# Patient Record
Sex: FEMALE | NOT HISPANIC OR LATINO | Employment: FULL TIME | URBAN - METROPOLITAN AREA
[De-identification: names, ages, dates, MRNs, and addresses within clinical notes are randomized per-mention and may not be internally consistent; named-entity substitution may affect disease eponyms.]

---

## 2018-10-29 ENCOUNTER — TRANSCRIBE ORDERS (OUTPATIENT)
Dept: ADMINISTRATIVE | Facility: HOSPITAL | Age: 48
End: 2018-10-29

## 2018-10-29 DIAGNOSIS — R07.9 CHEST PAIN, UNSPECIFIED TYPE: Primary | ICD-10-CM

## 2018-10-29 DIAGNOSIS — R42 DIZZINESS: ICD-10-CM

## 2018-11-01 ENCOUNTER — HOSPITAL ENCOUNTER (OUTPATIENT)
Dept: CARDIOLOGY | Facility: HOSPITAL | Age: 48
Discharge: HOME | End: 2018-11-01
Admitting: NURSE PRACTITIONER

## 2018-11-01 DIAGNOSIS — R07.9 CHEST PAIN, UNSPECIFIED TYPE: ICD-10-CM

## 2018-11-01 DIAGNOSIS — R42 DIZZINESS: ICD-10-CM

## 2018-11-01 LAB
BH CV ECHO MEAS - ACS: 1.7 CM
BH CV ECHO MEAS - AO MAX PG (FULL): 3 MMHG
BH CV ECHO MEAS - AO MAX PG: 5.4 MMHG
BH CV ECHO MEAS - AO MEAN PG (FULL): 2 MMHG
BH CV ECHO MEAS - AO MEAN PG: 3 MMHG
BH CV ECHO MEAS - AO ROOT AREA (BSA CORRECTED): 1.5
BH CV ECHO MEAS - AO ROOT AREA: 4.5 CM^2
BH CV ECHO MEAS - AO ROOT DIAM: 2.4 CM
BH CV ECHO MEAS - AO V2 MAX: 116 CM/SEC
BH CV ECHO MEAS - AO V2 MEAN: 87.5 CM/SEC
BH CV ECHO MEAS - AO V2 VTI: 20 CM
BH CV ECHO MEAS - AVA(I,A): 2 CM^2
BH CV ECHO MEAS - AVA(I,D): 2 CM^2
BH CV ECHO MEAS - AVA(V,A): 2.1 CM^2
BH CV ECHO MEAS - AVA(V,D): 2.1 CM^2
BH CV ECHO MEAS - BSA(HAYCOCK): 1.7 M^2
BH CV ECHO MEAS - BSA: 1.6 M^2
BH CV ECHO MEAS - BZI_BMI: 26.8 KILOGRAMS/M^2
BH CV ECHO MEAS - BZI_METRIC_HEIGHT: 154.9 CM
BH CV ECHO MEAS - BZI_METRIC_WEIGHT: 64.4 KG
BH CV ECHO MEAS - EDV(CUBED): 50.7 ML
BH CV ECHO MEAS - EDV(MOD-SP2): 67 ML
BH CV ECHO MEAS - EDV(MOD-SP4): 64 ML
BH CV ECHO MEAS - EDV(TEICH): 58.1 ML
BH CV ECHO MEAS - EF(CUBED): 65.3 %
BH CV ECHO MEAS - EF(MOD-BP): 55 %
BH CV ECHO MEAS - EF(MOD-SP2): 59.7 %
BH CV ECHO MEAS - EF(MOD-SP4): 67.2 %
BH CV ECHO MEAS - EF(TEICH): 57.7 %
BH CV ECHO MEAS - ESV(CUBED): 17.6 ML
BH CV ECHO MEAS - ESV(MOD-SP2): 27 ML
BH CV ECHO MEAS - ESV(MOD-SP4): 21 ML
BH CV ECHO MEAS - ESV(TEICH): 24.6 ML
BH CV ECHO MEAS - FS: 29.7 %
BH CV ECHO MEAS - IVS/LVPW: 1
BH CV ECHO MEAS - IVSD: 0.9 CM
BH CV ECHO MEAS - LAT PEAK E' VEL: 17.2 CM/SEC
BH CV ECHO MEAS - LV DIASTOLIC VOL/BSA (35-75): 39.2 ML/M^2
BH CV ECHO MEAS - LV MASS(C)D: 96.9 GRAMS
BH CV ECHO MEAS - LV MASS(C)DI: 59.3 GRAMS/M^2
BH CV ECHO MEAS - LV MAX PG: 2.4 MMHG
BH CV ECHO MEAS - LV MEAN PG: 1 MMHG
BH CV ECHO MEAS - LV SYSTOLIC VOL/BSA (12-30): 12.9 ML/M^2
BH CV ECHO MEAS - LV V1 MAX: 77.6 CM/SEC
BH CV ECHO MEAS - LV V1 MEAN: 53.7 CM/SEC
BH CV ECHO MEAS - LV V1 VTI: 12.8 CM
BH CV ECHO MEAS - LVIDD: 3.7 CM
BH CV ECHO MEAS - LVIDS: 2.6 CM
BH CV ECHO MEAS - LVLD AP2: 7.4 CM
BH CV ECHO MEAS - LVLD AP4: 6.4 CM
BH CV ECHO MEAS - LVLS AP2: 6.4 CM
BH CV ECHO MEAS - LVLS AP4: 6.2 CM
BH CV ECHO MEAS - LVOT AREA (M): 3.1 CM^2
BH CV ECHO MEAS - LVOT AREA: 3.1 CM^2
BH CV ECHO MEAS - LVOT DIAM: 2 CM
BH CV ECHO MEAS - LVPWD: 0.9 CM
BH CV ECHO MEAS - MED PEAK E' VEL: 7.9 CM/SEC
BH CV ECHO MEAS - MV A DUR: 0.13 SEC
BH CV ECHO MEAS - MV A MAX VEL: 76.2 CM/SEC
BH CV ECHO MEAS - MV DEC SLOPE: 304 CM/SEC^2
BH CV ECHO MEAS - MV DEC TIME: 0.25 SEC
BH CV ECHO MEAS - MV E MAX VEL: 59.2 CM/SEC
BH CV ECHO MEAS - MV E/A: 0.78
BH CV ECHO MEAS - MV MAX PG: 3.9 MMHG
BH CV ECHO MEAS - MV MEAN PG: 2 MMHG
BH CV ECHO MEAS - MV P1/2T MAX VEL: 83.6 CM/SEC
BH CV ECHO MEAS - MV P1/2T: 80.5 MSEC
BH CV ECHO MEAS - MV V2 MAX: 99.1 CM/SEC
BH CV ECHO MEAS - MV V2 MEAN: 69.1 CM/SEC
BH CV ECHO MEAS - MV V2 VTI: 18.4 CM
BH CV ECHO MEAS - MVA P1/2T LCG: 2.6 CM^2
BH CV ECHO MEAS - MVA(P1/2T): 2.7 CM^2
BH CV ECHO MEAS - MVA(VTI): 2.2 CM^2
BH CV ECHO MEAS - PA ACC TIME: 0.13 SEC
BH CV ECHO MEAS - PA MAX PG (FULL): 0.78 MMHG
BH CV ECHO MEAS - PA MAX PG: 2.4 MMHG
BH CV ECHO MEAS - PA PR(ACCEL): 20.5 MMHG
BH CV ECHO MEAS - PA V2 MAX: 77.4 CM/SEC
BH CV ECHO MEAS - PULM A REVS DUR: 0.13 SEC
BH CV ECHO MEAS - PULM A REVS VEL: 29.6 CM/SEC
BH CV ECHO MEAS - PULM DIAS VEL: 27.6 CM/SEC
BH CV ECHO MEAS - PULM S/D: 1.6
BH CV ECHO MEAS - PULM SYS VEL: 43.7 CM/SEC
BH CV ECHO MEAS - PVA(V,A): 1.3 CM^2
BH CV ECHO MEAS - PVA(V,D): 1.3 CM^2
BH CV ECHO MEAS - QP/QS: 0.38
BH CV ECHO MEAS - RV MAX PG: 1.6 MMHG
BH CV ECHO MEAS - RV MEAN PG: 1 MMHG
BH CV ECHO MEAS - RV V1 MAX: 63.5 CM/SEC
BH CV ECHO MEAS - RV V1 MEAN: 46 CM/SEC
BH CV ECHO MEAS - RV V1 VTI: 9.8 CM
BH CV ECHO MEAS - RVOT AREA: 1.5 CM^2
BH CV ECHO MEAS - RVOT DIAM: 1.4 CM
BH CV ECHO MEAS - SI(AO): 55.4 ML/M^2
BH CV ECHO MEAS - SI(CUBED): 20.3 ML/M^2
BH CV ECHO MEAS - SI(LVOT): 24.6 ML/M^2
BH CV ECHO MEAS - SI(MOD-SP2): 24.5 ML/M^2
BH CV ECHO MEAS - SI(MOD-SP4): 26.3 ML/M^2
BH CV ECHO MEAS - SI(TEICH): 20.5 ML/M^2
BH CV ECHO MEAS - SV(AO): 90.5 ML
BH CV ECHO MEAS - SV(CUBED): 33.1 ML
BH CV ECHO MEAS - SV(LVOT): 40.2 ML
BH CV ECHO MEAS - SV(MOD-SP2): 40 ML
BH CV ECHO MEAS - SV(MOD-SP4): 43 ML
BH CV ECHO MEAS - SV(RVOT): 15.1 ML
BH CV ECHO MEAS - SV(TEICH): 33.5 ML
BH CV ECHO MEAS - TAPSE (>1.6): 1.6 CM2
BH CV ECHO MEASUREMENTS AVERAGE E/E' RATIO: 4.72
BH CV STRESS BP STAGE 1: NORMAL
BH CV STRESS BP STAGE 2: NORMAL
BH CV STRESS DURATION MIN STAGE 1: 3
BH CV STRESS DURATION MIN STAGE 2: 2
BH CV STRESS DURATION SEC STAGE 1: 0
BH CV STRESS DURATION SEC STAGE 2: 5
BH CV STRESS ECHO POST STRESS EJECTION FRACTION EF: 66 %
BH CV STRESS GRADE STAGE 1: 10
BH CV STRESS GRADE STAGE 2: 12
BH CV STRESS HR STAGE 1: 152
BH CV STRESS HR STAGE 2: 164
BH CV STRESS METS STAGE 1: 5
BH CV STRESS METS STAGE 2: 7.5
BH CV STRESS PROTOCOL 1: NORMAL
BH CV STRESS RECOVERY BP: NORMAL MMHG
BH CV STRESS RECOVERY HR: 108 BPM
BH CV STRESS SPEED STAGE 1: 1.7
BH CV STRESS SPEED STAGE 2: 2.5
BH CV STRESS STAGE 1: 1
BH CV STRESS STAGE 2: 2
BH CV XLRA - RV BASE: 2.6 CM
BH CV XLRA - RV LENGTH: 6.1 CM
BH CV XLRA - RV MID: 2.4 CM
BH CV XLRA - TDI S': 12.4 CM/SEC
LEFT ATRIUM VOLUME INDEX: 16 ML/M2
MAXIMAL PREDICTED HEART RATE: 174 BPM
PERCENT MAX PREDICTED HR: 94.25 %
STRESS BASELINE BP: NORMAL MMHG
STRESS BASELINE HR: 117 BPM
STRESS PERCENT HR: 111 %
STRESS POST ESTIMATED WORKLOAD: 7.1 METS
STRESS POST EXERCISE DUR MIN: 5 MIN
STRESS POST EXERCISE DUR SEC: 5 SEC
STRESS POST PEAK BP: NORMAL MMHG
STRESS POST PEAK HR: 164 BPM
STRESS TARGET HR: 148 BPM

## 2018-11-01 PROCEDURE — 93016 CV STRESS TEST SUPVJ ONLY: CPT | Performed by: INTERNAL MEDICINE

## 2018-11-01 PROCEDURE — 93018 CV STRESS TEST I&R ONLY: CPT | Performed by: INTERNAL MEDICINE

## 2018-11-01 PROCEDURE — 93325 DOPPLER ECHO COLOR FLOW MAPG: CPT | Performed by: INTERNAL MEDICINE

## 2018-11-01 PROCEDURE — 93017 CV STRESS TEST TRACING ONLY: CPT

## 2018-11-01 PROCEDURE — 93350 STRESS TTE ONLY: CPT

## 2018-11-01 PROCEDURE — 93350 STRESS TTE ONLY: CPT | Performed by: INTERNAL MEDICINE

## 2018-11-01 PROCEDURE — 93325 DOPPLER ECHO COLOR FLOW MAPG: CPT

## 2018-11-01 PROCEDURE — 93320 DOPPLER ECHO COMPLETE: CPT

## 2018-11-01 PROCEDURE — 93352 ADMIN ECG CONTRAST AGENT: CPT | Performed by: INTERNAL MEDICINE

## 2018-11-01 PROCEDURE — 25010000002 PERFLUTREN (DEFINITY) 8.476 MG IN SODIUM CHLORIDE 0.9 % 10 ML INJECTION: Performed by: INTERNAL MEDICINE

## 2018-11-01 PROCEDURE — 93320 DOPPLER ECHO COMPLETE: CPT | Performed by: INTERNAL MEDICINE

## 2018-11-01 RX ORDER — CYCLOBENZAPRINE HCL 10 MG
TABLET ORAL AS NEEDED
COMMUNITY
Start: 2018-08-14

## 2018-11-01 RX ORDER — SPIRONOLACTONE 100 MG/1
100 TABLET, FILM COATED ORAL 2 TIMES DAILY
COMMUNITY
Start: 2017-12-01

## 2018-11-01 RX ORDER — MINOCYCLINE HYDROCHLORIDE 50 MG/1
CAPSULE ORAL AS NEEDED
COMMUNITY
Start: 2018-05-21

## 2018-11-01 RX ADMIN — SODIUM CHLORIDE 5 ML: 9 INJECTION INTRAMUSCULAR; INTRAVENOUS; SUBCUTANEOUS at 15:39

## 2021-04-02 ENCOUNTER — BULK ORDERING (OUTPATIENT)
Dept: CASE MANAGEMENT | Facility: OTHER | Age: 51
End: 2021-04-02

## 2021-04-02 DIAGNOSIS — Z23 IMMUNIZATION DUE: ICD-10-CM

## 2021-09-15 ENCOUNTER — TRANSCRIBE ORDERS (OUTPATIENT)
Dept: PHYSICAL THERAPY | Facility: CLINIC | Age: 51
End: 2021-09-15

## 2021-09-15 DIAGNOSIS — M46.1 INFLAMMATION OF SACROILIAC JOINT (HCC): Primary | ICD-10-CM

## 2023-08-14 ENCOUNTER — OFFICE VISIT (OUTPATIENT)
Dept: NEUROLOGY | Facility: CLINIC | Age: 53
End: 2023-08-14
Payer: COMMERCIAL

## 2023-08-14 ENCOUNTER — LAB (OUTPATIENT)
Dept: LAB | Facility: HOSPITAL | Age: 53
End: 2023-08-14
Payer: COMMERCIAL

## 2023-08-14 VITALS
OXYGEN SATURATION: 99 % | HEIGHT: 61 IN | HEART RATE: 91 BPM | SYSTOLIC BLOOD PRESSURE: 126 MMHG | DIASTOLIC BLOOD PRESSURE: 70 MMHG | BODY MASS INDEX: 30.78 KG/M2 | WEIGHT: 163 LBS

## 2023-08-14 DIAGNOSIS — G56.03 BILATERAL CARPAL TUNNEL SYNDROME: ICD-10-CM

## 2023-08-14 DIAGNOSIS — R41.89 BRAIN FOG: ICD-10-CM

## 2023-08-14 DIAGNOSIS — R20.2 PARESTHESIAS: Primary | ICD-10-CM

## 2023-08-14 DIAGNOSIS — G47.33 OBSTRUCTIVE SLEEP APNEA: ICD-10-CM

## 2023-08-14 DIAGNOSIS — R41.89 COGNITIVE CHANGE: ICD-10-CM

## 2023-08-14 LAB — VIT B12 BLD-MCNC: 1110 PG/ML (ref 211–946)

## 2023-08-14 PROCEDURE — 36415 COLL VENOUS BLD VENIPUNCTURE: CPT | Performed by: PSYCHIATRY & NEUROLOGY

## 2023-08-14 PROCEDURE — 82607 VITAMIN B-12: CPT | Performed by: PSYCHIATRY & NEUROLOGY

## 2023-08-14 RX ORDER — HYDROXYZINE 50 MG/1
50 TABLET, FILM COATED ORAL EVERY 8 HOURS PRN
COMMUNITY
Start: 2023-07-24

## 2023-08-14 RX ORDER — TRAMADOL HYDROCHLORIDE 50 MG/1
50 TABLET ORAL EVERY 6 HOURS PRN
COMMUNITY

## 2023-08-14 RX ORDER — MAGNESIUM OXIDE 400 MG/1
TABLET ORAL
COMMUNITY

## 2023-08-14 RX ORDER — DIAZEPAM 5 MG/1
TABLET ORAL
COMMUNITY

## 2023-08-14 NOTE — PROGRESS NOTES
Chief Complaint   Patient presents with    Amnesia       Patient ID: Kaylie Perez is a 52 y.o. female.    HPI: I had the pleasure of seeing your patient today.  As you may know she is a 52-year-old female being seen at the request of and referred to us by CHIQUIS Young for memory change.  The patient says that she noted the memory change specifically after COVID-19 infection in February of this year.  She states that during COVID she noted some brain fog.  She is also having trouble with losing things.  She would lose her keys a lot.  She would lose her phone often.  This was abnormal for her.  She also notes that she would forget to flush the toilet.  She never left the stove on or water running.  She was experiencing more fatigue as well, specifically after exertion for example working out.  She has noted some progression of the symptoms as well.  She is doing some cognitive therapy exercises on her own however she has not noted significant improvement.  Of note she was recently diagnosed with obstructive sleep apnea.  He has not yet started using a CPAP.  She does experience paresthesias in her feet as well as in her hands.  She does have a history of carpal tunnel syndrome bilaterally and is status post surgical release in 2009.  She does sleep with wrist splints.  No family history of dementia that she is aware of however her father started to have some memory change issues.    The following portions of the patient's history were reviewed and updated as appropriate: allergies, current medications, past family history, past medical history, past social history, past surgical history and problem list.    Review of Systems   Constitutional:  Positive for fatigue. Negative for chills and fever.   Respiratory:  Positive for apnea, cough, shortness of breath and wheezing.    Cardiovascular:  Positive for chest pain and palpitations.   Gastrointestinal:  Negative for diarrhea, nausea and vomiting.    Endocrine: Negative for cold intolerance, heat intolerance and polydipsia.   Genitourinary:  Negative for decreased urine volume, difficulty urinating and urgency.   Musculoskeletal:  Positive for back pain, neck pain and neck stiffness.   Skin:  Negative for color change, rash and wound.   Allergic/Immunologic: Negative for environmental allergies, food allergies and immunocompromised state.   Neurological:  Positive for dizziness, numbness (hands) and headaches. Negative for tremors, seizures, syncope, facial asymmetry, speech difficulty, weakness and light-headedness.   Hematological:  Negative for adenopathy. Does not bruise/bleed easily.   Psychiatric/Behavioral:  Positive for confusion (steffen fog) and sleep disturbance. Negative for decreased concentration. The patient is nervous/anxious.     I have reviewed the review of systems above performed by my medical assistant.      Vitals:    08/14/23 1426   BP: 126/70   Pulse: 91   SpO2: 99%       Neurologic Exam     Mental Status   Oriented to person, place, and time.   Registration: recalls 3 of 3 objects. Follows 3 step commands.   Attention: normal. Concentration: normal.   Speech: speech is normal   Level of consciousness: alert  Knowledge: consistent with education (No deficits found.).   Normal comprehension.     Cranial Nerves     CN II   Visual fields full to confrontation.     CN III, IV, VI   Pupils are equal, round, and reactive to light.  Extraocular motions are normal.   CN III: no CN III palsy  CN VI: no CN VI palsy  Nystagmus: none   Diplopia: none    CN V   Facial sensation intact.     CN VII   Facial expression full, symmetric.     CN VIII   CN VIII normal.     CN IX, X   CN IX normal.   CN X normal.     CN XI   CN XI normal.     CN XII   CN XII normal.     Motor Exam   Muscle bulk: normal  Right arm tone: normal  Left arm tone: normal  Right leg tone: normal  Left leg tone: normal    Strength   Right neck flexion: 5/5  Left neck flexion:  5/5  Right neck extension: 5/5  Left neck extension: 5/5  Right deltoid: 5/5  Left deltoid: 5/5  Right biceps: 5/5  Left biceps: 5/5  Right triceps: 5/5  Left triceps: 5/5  Right wrist flexion: 5/5  Left wrist flexion: 5/5  Right wrist extension: 5/5  Left wrist extension: 5/5  Right interossei: 5/5  Left interossei: 5/5  Right abdominals: 5/5  Left abdominals: 5/5  Right iliopsoas: 5/5  Left iliopsoas: 5/5  Right quadriceps: 5/5  Left quadriceps: 5/5  Right hamstrin/5  Left hamstrin/5  Right glutei: 5/5  Left glutei: 5/5  Right anterior tibial: 5/5  Left anterior tibial: 5/5  Right posterior tibial: 5/5  Left posterior tibial: 5/5  Right peroneal: 5/5  Left peroneal: 5/5  Right gastroc: 5/5  Left gastroc: 5/5    Sensory Exam   Light touch normal.   Vibration normal.   Proprioception normal.   Pinprick normal.     Gait, Coordination, and Reflexes     Gait  Gait: normal    Coordination   Romberg: negative    Tremor   Resting tremor: absent  Intention tremor: absent    Reflexes   Right brachioradialis: 2+  Left brachioradialis: 2+  Right biceps: 2+  Left biceps: 2+  Right triceps: 2+  Left triceps: 2+  Right patellar: 2+  Left patellar: 2+  Right achilles: 2+  Left achilles: 2+  Right : 2+  Left : 2+Station is normal.     Physical Exam  Vitals reviewed.   Constitutional:       General: She is not in acute distress.     Appearance: She is well-developed.   HENT:      Head: Normocephalic and atraumatic.   Eyes:      Extraocular Movements: EOM normal.      Pupils: Pupils are equal, round, and reactive to light.   Cardiovascular:      Rate and Rhythm: Normal rate and regular rhythm.      Heart sounds: Normal heart sounds.   Pulmonary:      Effort: Pulmonary effort is normal. No respiratory distress.      Breath sounds: Normal breath sounds.   Abdominal:      General: Bowel sounds are normal. There is no distension.      Palpations: Abdomen is soft.      Tenderness: There is no abdominal tenderness.    Musculoskeletal:         General: No deformity.      Cervical back: Normal range of motion.   Skin:     General: Skin is warm.      Findings: No rash.   Neurological:      Mental Status: She is oriented to person, place, and time.      Coordination: Romberg Test normal.      Gait: Gait is intact.      Deep Tendon Reflexes:      Reflex Scores:       Tricep reflexes are 2+ on the right side and 2+ on the left side.       Bicep reflexes are 2+ on the right side and 2+ on the left side.       Brachioradialis reflexes are 2+ on the right side and 2+ on the left side.       Patellar reflexes are 2+ on the right side and 2+ on the left side.       Achilles reflexes are 2+ on the right side and 2+ on the left side.  Psychiatric:         Speech: Speech normal.         Judgment: Judgment normal.       Procedures    Assessment/Plan: I did talk to her at great length about some of the risk factors that cognitive issues in her age group, specifically the untreated sleep apnea as well as the stress that she has been experiencing in life in general.  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  We will check a vitamin B12 level today.  We did talk about cognitive assessments and we will schedule her for neuropsych testing.  However I did encourage her to get the CPAP machine and start using it prior to cognitive testing.  Therefore she will schedule the date of neuropsych testing out for a few months after she starts CPAP therapy.       Diagnoses and all orders for this visit:    1. Paresthesias (Primary)  -     Vitamin B12    2. Bilateral carpal tunnel syndrome    3. Brain fog  -     Vitamin B12  -     Ambulatory Referral to Neuropsychology    4. Cognitive change  -     Vitamin B12  -     Ambulatory Referral to Neuropsychology    5. Obstructive sleep apnea           Vitor Khalil II, MD

## 2023-08-14 NOTE — LETTER
August 14, 2023       No Recipients    Patient: Kaylie Perez   YOB: 1970   Date of Visit: 8/14/2023     Dear CHIQUIS Arzate:       Thank you for referring Kaylie Perez to me for evaluation. Below are the relevant portions of my assessment and plan of care.    If you have questions, please do not hesitate to call me. I look forward to following Kaylie along with you.         Sincerely,        Vitor Khalil II, MD        CC:   No Recipients    Vitor Khalil II, MD  08/14/23 1525  Sign when Signing Visit  Chief Complaint   Patient presents with    Amnesia       Patient ID: Kaylie Perez is a 52 y.o. female.    HPI: I had the pleasure of seeing your patient today.  As you may know she is a 52-year-old female being seen at the request of and referred to us by CHIQUIS Young for memory change.  The patient says that she noted the memory change specifically after COVID-19 infection in February of this year.  She states that during COVID she noted some brain fog.  She is also having trouble with losing things.  She would lose her keys a lot.  She would lose her phone often.  This was abnormal for her.  She also notes that she would forget to flush the toilet.  She never left the stove on or water running.  She was experiencing more fatigue as well, specifically after exertion for example working out.  She has noted some progression of the symptoms as well.  She is doing some cognitive therapy exercises on her own however she has not noted significant improvement.  Of note she was recently diagnosed with obstructive sleep apnea.  He has not yet started using a CPAP.  She does experience paresthesias in her feet as well as in her hands.  She does have a history of carpal tunnel syndrome bilaterally and is status post surgical release in 2009.  She does sleep with wrist splints.  No family history of dementia that she is aware of however her father started to have some  memory change issues.    The following portions of the patient's history were reviewed and updated as appropriate: allergies, current medications, past family history, past medical history, past social history, past surgical history and problem list.    Review of Systems   Constitutional:  Positive for fatigue. Negative for chills and fever.   Respiratory:  Positive for apnea, cough, shortness of breath and wheezing.    Cardiovascular:  Positive for chest pain and palpitations.   Gastrointestinal:  Negative for diarrhea, nausea and vomiting.   Endocrine: Negative for cold intolerance, heat intolerance and polydipsia.   Genitourinary:  Negative for decreased urine volume, difficulty urinating and urgency.   Musculoskeletal:  Positive for back pain, neck pain and neck stiffness.   Skin:  Negative for color change, rash and wound.   Allergic/Immunologic: Negative for environmental allergies, food allergies and immunocompromised state.   Neurological:  Positive for dizziness, numbness (hands) and headaches. Negative for tremors, seizures, syncope, facial asymmetry, speech difficulty, weakness and light-headedness.   Hematological:  Negative for adenopathy. Does not bruise/bleed easily.   Psychiatric/Behavioral:  Positive for confusion (steffen fog) and sleep disturbance. Negative for decreased concentration. The patient is nervous/anxious.     I have reviewed the review of systems above performed by my medical assistant.      Vitals:    08/14/23 1426   BP: 126/70   Pulse: 91   SpO2: 99%       Neurologic Exam     Mental Status   Oriented to person, place, and time.   Registration: recalls 3 of 3 objects. Follows 3 step commands.   Attention: normal. Concentration: normal.   Speech: speech is normal   Level of consciousness: alert  Knowledge: consistent with education (No deficits found.).   Normal comprehension.     Cranial Nerves     CN II   Visual fields full to confrontation.     CN III, IV, VI   Pupils are equal,  round, and reactive to light.  Extraocular motions are normal.   CN III: no CN III palsy  CN VI: no CN VI palsy  Nystagmus: none   Diplopia: none    CN V   Facial sensation intact.     CN VII   Facial expression full, symmetric.     CN VIII   CN VIII normal.     CN IX, X   CN IX normal.   CN X normal.     CN XI   CN XI normal.     CN XII   CN XII normal.     Motor Exam   Muscle bulk: normal  Right arm tone: normal  Left arm tone: normal  Right leg tone: normal  Left leg tone: normal    Strength   Right neck flexion: 5/5  Left neck flexion: 5/5  Right neck extension: 5/5  Left neck extension: 5/5  Right deltoid: 5/5  Left deltoid: 5/5  Right biceps: 5/5  Left biceps: 5/5  Right triceps: 5/5  Left triceps: 5/5  Right wrist flexion: 5/5  Left wrist flexion: 5/5  Right wrist extension: 5/5  Left wrist extension: 5/5  Right interossei: 5/5  Left interossei: 5/5  Right abdominals: 5/5  Left abdominals: 5/5  Right iliopsoas: 5/5  Left iliopsoas: 5/5  Right quadriceps: 5/5  Left quadriceps: 5/5  Right hamstrin/5  Left hamstrin/5  Right glutei: 5/5  Left glutei: 5/5  Right anterior tibial: 5/5  Left anterior tibial: 5/5  Right posterior tibial: 5/5  Left posterior tibial: 5/5  Right peroneal: 5/5  Left peroneal: 5/5  Right gastroc: 5/5  Left gastroc: 5/5    Sensory Exam   Light touch normal.   Vibration normal.   Proprioception normal.   Pinprick normal.     Gait, Coordination, and Reflexes     Gait  Gait: normal    Coordination   Romberg: negative    Tremor   Resting tremor: absent  Intention tremor: absent    Reflexes   Right brachioradialis: 2+  Left brachioradialis: 2+  Right biceps: 2+  Left biceps: 2+  Right triceps: 2+  Left triceps: 2+  Right patellar: 2+  Left patellar: 2+  Right achilles: 2+  Left achilles: 2+  Right : 2+  Left : 2+Station is normal.     Physical Exam  Vitals reviewed.   Constitutional:       General: She is not in acute distress.     Appearance: She is well-developed.   HENT:       Head: Normocephalic and atraumatic.   Eyes:      Extraocular Movements: EOM normal.      Pupils: Pupils are equal, round, and reactive to light.   Cardiovascular:      Rate and Rhythm: Normal rate and regular rhythm.      Heart sounds: Normal heart sounds.   Pulmonary:      Effort: Pulmonary effort is normal. No respiratory distress.      Breath sounds: Normal breath sounds.   Abdominal:      General: Bowel sounds are normal. There is no distension.      Palpations: Abdomen is soft.      Tenderness: There is no abdominal tenderness.   Musculoskeletal:         General: No deformity.      Cervical back: Normal range of motion.   Skin:     General: Skin is warm.      Findings: No rash.   Neurological:      Mental Status: She is oriented to person, place, and time.      Coordination: Romberg Test normal.      Gait: Gait is intact.      Deep Tendon Reflexes:      Reflex Scores:       Tricep reflexes are 2+ on the right side and 2+ on the left side.       Bicep reflexes are 2+ on the right side and 2+ on the left side.       Brachioradialis reflexes are 2+ on the right side and 2+ on the left side.       Patellar reflexes are 2+ on the right side and 2+ on the left side.       Achilles reflexes are 2+ on the right side and 2+ on the left side.  Psychiatric:         Speech: Speech normal.         Judgment: Judgment normal.       Procedures    Assessment/Plan: I did talk to her at great length about some of the risk factors that cognitive issues in her age group, specifically the untreated sleep apnea as well as the stress that she has been experiencing in life in general.  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  We will check a vitamin B12 level today.  We did talk about cognitive assessments and we will schedule her for neuropsych testing.  However I did encourage her to get the CPAP machine and start using it prior to cognitive testing.  Therefore she will schedule the date of neuropsych testing out for a few months  after she starts CPAP therapy.       Diagnoses and all orders for this visit:    1. Paresthesias (Primary)  -     Vitamin B12    2. Bilateral carpal tunnel syndrome    3. Brain fog  -     Vitamin B12  -     Ambulatory Referral to Neuropsychology    4. Cognitive change  -     Vitamin B12  -     Ambulatory Referral to Neuropsychology    5. Obstructive sleep apnea           Vitor Khalil II, MD

## 2023-08-14 NOTE — LETTER
August 14, 2023       No Recipients    Patient: Kaylie Perez   YOB: 1970   Date of Visit: 8/14/2023     Dear CHIQUIS Colon:       Thank you for referring Kaylie Perez to me for evaluation. Below are the relevant portions of my assessment and plan of care.    If you have questions, please do not hesitate to call me. I look forward to following Kaylie along with you.         Sincerely,        Vitor Khalil II, MD        CC:   No Recipients    Vitor Khalil II, MD  08/14/23 1525  Signed  Chief Complaint   Patient presents with    Amnesia       Patient ID: Kaylie Perez is a 52 y.o. female.    HPI: I had the pleasure of seeing your patient today.  As you may know she is a 52-year-old female being seen at the request of and referred to us by CHIQUIS Young for memory change.  The patient says that she noted the memory change specifically after COVID-19 infection in February of this year.  She states that during COVID she noted some brain fog.  She is also having trouble with losing things.  She would lose her keys a lot.  She would lose her phone often.  This was abnormal for her.  She also notes that she would forget to flush the toilet.  She never left the stove on or water running.  She was experiencing more fatigue as well, specifically after exertion for example working out.  She has noted some progression of the symptoms as well.  She is doing some cognitive therapy exercises on her own however she has not noted significant improvement.  Of note she was recently diagnosed with obstructive sleep apnea.  He has not yet started using a CPAP.  She does experience paresthesias in her feet as well as in her hands.  She does have a history of carpal tunnel syndrome bilaterally and is status post surgical release in 2009.  She does sleep with wrist splints.  No family history of dementia that she is aware of however her father started to have some memory change  issues.    The following portions of the patient's history were reviewed and updated as appropriate: allergies, current medications, past family history, past medical history, past social history, past surgical history and problem list.    Review of Systems   Constitutional:  Positive for fatigue. Negative for chills and fever.   Respiratory:  Positive for apnea, cough, shortness of breath and wheezing.    Cardiovascular:  Positive for chest pain and palpitations.   Gastrointestinal:  Negative for diarrhea, nausea and vomiting.   Endocrine: Negative for cold intolerance, heat intolerance and polydipsia.   Genitourinary:  Negative for decreased urine volume, difficulty urinating and urgency.   Musculoskeletal:  Positive for back pain, neck pain and neck stiffness.   Skin:  Negative for color change, rash and wound.   Allergic/Immunologic: Negative for environmental allergies, food allergies and immunocompromised state.   Neurological:  Positive for dizziness, numbness (hands) and headaches. Negative for tremors, seizures, syncope, facial asymmetry, speech difficulty, weakness and light-headedness.   Hematological:  Negative for adenopathy. Does not bruise/bleed easily.   Psychiatric/Behavioral:  Positive for confusion (steffen fog) and sleep disturbance. Negative for decreased concentration. The patient is nervous/anxious.     I have reviewed the review of systems above performed by my medical assistant.      Vitals:    08/14/23 1426   BP: 126/70   Pulse: 91   SpO2: 99%       Neurologic Exam     Mental Status   Oriented to person, place, and time.   Registration: recalls 3 of 3 objects. Follows 3 step commands.   Attention: normal. Concentration: normal.   Speech: speech is normal   Level of consciousness: alert  Knowledge: consistent with education (No deficits found.).   Normal comprehension.     Cranial Nerves     CN II   Visual fields full to confrontation.     CN III, IV, VI   Pupils are equal, round, and reactive  to light.  Extraocular motions are normal.   CN III: no CN III palsy  CN VI: no CN VI palsy  Nystagmus: none   Diplopia: none    CN V   Facial sensation intact.     CN VII   Facial expression full, symmetric.     CN VIII   CN VIII normal.     CN IX, X   CN IX normal.   CN X normal.     CN XI   CN XI normal.     CN XII   CN XII normal.     Motor Exam   Muscle bulk: normal  Right arm tone: normal  Left arm tone: normal  Right leg tone: normal  Left leg tone: normal    Strength   Right neck flexion: 5/5  Left neck flexion: 5/5  Right neck extension: 5/5  Left neck extension: 5/5  Right deltoid: 5/5  Left deltoid: 5/5  Right biceps: 5/5  Left biceps: 5/5  Right triceps: 5/5  Left triceps: 5/5  Right wrist flexion: 5/5  Left wrist flexion: 5/5  Right wrist extension: 5/5  Left wrist extension: 5/5  Right interossei: 5/5  Left interossei: 5/5  Right abdominals: 5/5  Left abdominals: 5/5  Right iliopsoas: 5/5  Left iliopsoas: 5/5  Right quadriceps: 5/5  Left quadriceps: 5/5  Right hamstrin/5  Left hamstrin/5  Right glutei: 5/5  Left glutei: 5/5  Right anterior tibial: 5/5  Left anterior tibial: 5/5  Right posterior tibial: 5/5  Left posterior tibial: 5/5  Right peroneal: 5/5  Left peroneal: 5/5  Right gastroc: 5/5  Left gastroc: 5/5    Sensory Exam   Light touch normal.   Vibration normal.   Proprioception normal.   Pinprick normal.     Gait, Coordination, and Reflexes     Gait  Gait: normal    Coordination   Romberg: negative    Tremor   Resting tremor: absent  Intention tremor: absent    Reflexes   Right brachioradialis: 2+  Left brachioradialis: 2+  Right biceps: 2+  Left biceps: 2+  Right triceps: 2+  Left triceps: 2+  Right patellar: 2+  Left patellar: 2+  Right achilles: 2+  Left achilles: 2+  Right : 2+  Left : 2+Station is normal.     Physical Exam  Vitals reviewed.   Constitutional:       General: She is not in acute distress.     Appearance: She is well-developed.   HENT:      Head: Normocephalic  and atraumatic.   Eyes:      Extraocular Movements: EOM normal.      Pupils: Pupils are equal, round, and reactive to light.   Cardiovascular:      Rate and Rhythm: Normal rate and regular rhythm.      Heart sounds: Normal heart sounds.   Pulmonary:      Effort: Pulmonary effort is normal. No respiratory distress.      Breath sounds: Normal breath sounds.   Abdominal:      General: Bowel sounds are normal. There is no distension.      Palpations: Abdomen is soft.      Tenderness: There is no abdominal tenderness.   Musculoskeletal:         General: No deformity.      Cervical back: Normal range of motion.   Skin:     General: Skin is warm.      Findings: No rash.   Neurological:      Mental Status: She is oriented to person, place, and time.      Coordination: Romberg Test normal.      Gait: Gait is intact.      Deep Tendon Reflexes:      Reflex Scores:       Tricep reflexes are 2+ on the right side and 2+ on the left side.       Bicep reflexes are 2+ on the right side and 2+ on the left side.       Brachioradialis reflexes are 2+ on the right side and 2+ on the left side.       Patellar reflexes are 2+ on the right side and 2+ on the left side.       Achilles reflexes are 2+ on the right side and 2+ on the left side.  Psychiatric:         Speech: Speech normal.         Judgment: Judgment normal.       Procedures    Assessment/Plan: I did talk to her at great length about some of the risk factors that cognitive issues in her age group, specifically the untreated sleep apnea as well as the stress that she has been experiencing in life in general.  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  We will check a vitamin B12 level today.  We did talk about cognitive assessments and we will schedule her for neuropsych testing.  However I did encourage her to get the CPAP machine and start using it prior to cognitive testing.  Therefore she will schedule the date of neuropsych testing out for a few months after she starts CPAP  therapy.       Diagnoses and all orders for this visit:    1. Paresthesias (Primary)  -     Vitamin B12    2. Bilateral carpal tunnel syndrome    3. Brain fog  -     Vitamin B12  -     Ambulatory Referral to Neuropsychology    4. Cognitive change  -     Vitamin B12  -     Ambulatory Referral to Neuropsychology    5. Obstructive sleep apnea           Vitor Khalil II, MD

## 2023-08-15 ENCOUNTER — TELEPHONE (OUTPATIENT)
Dept: NEUROLOGY | Facility: CLINIC | Age: 53
End: 2023-08-15
Payer: COMMERCIAL

## 2023-08-15 NOTE — TELEPHONE ENCOUNTER
Attempted several time to fax patient paperwork to Dr Loretta Mccloud.  Sending paperwork in mail to Dr Mccloud AND to patient per in person conversation yesterday.

## 2023-08-24 ENCOUNTER — PATIENT ROUNDING (BHMG ONLY) (OUTPATIENT)
Dept: NEUROLOGY | Facility: CLINIC | Age: 53
End: 2023-08-24
Payer: COMMERCIAL

## 2024-03-14 ENCOUNTER — TELEPHONE (OUTPATIENT)
Dept: NEUROLOGY | Facility: CLINIC | Age: 54
End: 2024-03-14

## 2024-03-14 NOTE — TELEPHONE ENCOUNTER
Caller: NAYAN    Relationship: SELF    Best call back number:   Telephone Information:   Mobile [phone]     What is the best time to reach you: ANYTIME JUST LEAVE VM     Who are you requesting to speak with (clinical staff, provider,  specific staff member): CLINICAL    What was the call regarding: PT CALLED IN FOR A SOONER APPOINTMENT, I WAS ABLE TO SET PT UP ON 3-20-24, SHE HAD COMPLETED NEUROPSYCHOLOGY EVAL, SHE JUST ASK THE CLINIC TO REACH OUT FOR RESULTS AT [facility]. SHE DOES HAVE A COPY AS WELL BUT WOULD LIKE  TO REVIEW BEFORE HER APPOINTMENT.

## 2024-03-20 ENCOUNTER — OFFICE VISIT (OUTPATIENT)
Dept: NEUROLOGY | Facility: CLINIC | Age: 54
End: 2024-03-20
Payer: COMMERCIAL

## 2024-03-20 VITALS
OXYGEN SATURATION: 97 % | DIASTOLIC BLOOD PRESSURE: 80 MMHG | WEIGHT: 172 LBS | HEART RATE: 99 BPM | HEIGHT: 61 IN | BODY MASS INDEX: 32.47 KG/M2 | SYSTOLIC BLOOD PRESSURE: 124 MMHG

## 2024-03-20 DIAGNOSIS — R41.89 COGNITIVE CHANGE: ICD-10-CM

## 2024-03-20 DIAGNOSIS — F32.A ANXIETY AND DEPRESSION: Primary | ICD-10-CM

## 2024-03-20 DIAGNOSIS — F41.9 ANXIETY AND DEPRESSION: Primary | ICD-10-CM

## 2024-03-20 PROCEDURE — 99214 OFFICE O/P EST MOD 30 MIN: CPT | Performed by: PSYCHIATRY & NEUROLOGY

## 2024-03-20 RX ORDER — BUDESONIDE, GLYCOPYRROLATE, AND FORMOTEROL FUMARATE 160; 9; 4.8 UG/1; UG/1; UG/1
2 AEROSOL, METERED RESPIRATORY (INHALATION) 2 TIMES DAILY
COMMUNITY

## 2024-03-20 RX ORDER — ALBUTEROL SULFATE 90 UG/1
AEROSOL, METERED RESPIRATORY (INHALATION)
COMMUNITY
Start: 2023-10-18

## 2024-03-20 RX ORDER — CLOTRIMAZOLE AND BETAMETHASONE DIPROPIONATE 10; .64 MG/G; MG/G
CREAM TOPICAL
COMMUNITY

## 2024-03-20 RX ORDER — METHOCARBAMOL 750 MG/1
1 TABLET, FILM COATED ORAL 3 TIMES DAILY
COMMUNITY
Start: 2024-03-01

## 2024-03-20 NOTE — PROGRESS NOTES
Chief Complaint   Patient presents with    Numbness    Carpal Tunnel     bilateral    Cognitive Change    Brain Fog       Patient ID: Kaylie Perez is a 53 y.o. female.    HPI:  I had the pleasure of seeing your patient again today.  As you may know she is a 53-year-old female here for the management of memory change.  She states that she did have neuropsych testing.  We do have the report which indicates a diagnosis of depression and anxiety.  No evidence for mild cognitive impairment or dementia.  She does have a history of obstructive sleep apnea however she currently is not using a CPAP machine.  She continues to have forgetfulness and fatigue.  She does acknowledge history of depression and anxiety.  She has a lot of exhaustion.  She feels that it is much more difficult for her to analyze material which has affected her work.    The following portions of the patient's history were reviewed and updated as appropriate: allergies, current medications, past family history, past medical history, past social history, past surgical history and problem list.    Review of Systems   Constitutional:  Positive for fatigue.   Neurological:  Positive for speech difficulty (word retrieval), weakness (whole body), light-headedness and headaches. Negative for dizziness, tremors, seizures, syncope, facial asymmetry and numbness.   Psychiatric/Behavioral:  Positive for confusion, decreased concentration, dysphoric mood and sleep disturbance. Negative for agitation, behavioral problems, hallucinations, self-injury and suicidal ideas. The patient is not nervous/anxious and is not hyperactive.       I have reviewed the review of systems above performed by my medical assistant.      Vitals:    03/20/24 1323   BP: 124/80   Pulse: 99   SpO2: 97%       Neurologic Exam     Mental Status   Oriented to person, place, and time.   Concentration: normal.   Level of consciousness: alert  Knowledge: consistent with education (No deficits  found.).     Cranial Nerves     CN II   Visual fields full to confrontation.     CN III, IV, VI   Pupils are equal, round, and reactive to light.  Extraocular motions are normal.   CN III: no CN III palsy  CN VI: no CN VI palsy    CN V   Facial sensation intact.     CN VII   Facial expression full, symmetric.     CN VIII   CN VIII normal.     CN IX, X   CN IX normal.   CN X normal.     CN XI   CN XI normal.     CN XII   CN XII normal.     Motor Exam     Strength   Right neck flexion: 5/5  Left neck flexion: 5/5  Right neck extension: 5/5  Left neck extension: 5/5  Right deltoid: 5/5  Left deltoid: 5/5  Right biceps: 5/5  Left biceps: 5/5  Right triceps: 5/5  Left triceps: 5/5  Right wrist flexion: 5/5  Left wrist flexion: 5/5  Right wrist extension: 5/5  Left wrist extension: 5/5  Right interossei: 5/5  Left interossei: 5/5  Right abdominals: 5/5  Left abdominals: 5/5  Right iliopsoas: 5/5  Left iliopsoas: 5/5  Right quadriceps: 5/5  Left quadriceps: 5/5  Right hamstrin/5  Left hamstrin/5  Right glutei: 5/5  Left glutei: 5/5  Right anterior tibial: 5/5  Left anterior tibial: 5/5  Right posterior tibial: 5/5  Left posterior tibial: 5/5  Right peroneal: 5/5  Left peroneal: 5/5  Right gastroc: 5/5  Left gastroc: 5/5    Sensory Exam   Light touch normal.   Vibration normal.     Gait, Coordination, and Reflexes     Gait  Gait: normal    Reflexes   Right brachioradialis: 2+  Left brachioradialis: 2+  Right biceps: 2+  Left biceps: 2+  Right triceps: 2+  Left triceps: 2+  Right patellar: 2+  Left patellar: 2+  Right achilles: 2+  Left achilles: 2+  Right : 2+  Left : 2+Station is normal.       Physical Exam  Vitals reviewed.   Constitutional:       Appearance: She is well-developed.   HENT:      Head: Normocephalic and atraumatic.   Eyes:      Extraocular Movements: EOM normal.      Pupils: Pupils are equal, round, and reactive to light.   Cardiovascular:      Rate and Rhythm: Normal rate and regular  rhythm.   Pulmonary:      Breath sounds: Normal breath sounds.   Musculoskeletal:         General: Normal range of motion.   Skin:     General: Skin is warm.   Neurological:      Mental Status: She is oriented to person, place, and time.      Gait: Gait is intact.      Deep Tendon Reflexes:      Reflex Scores:       Tricep reflexes are 2+ on the right side and 2+ on the left side.       Bicep reflexes are 2+ on the right side and 2+ on the left side.       Brachioradialis reflexes are 2+ on the right side and 2+ on the left side.       Patellar reflexes are 2+ on the right side and 2+ on the left side.       Achilles reflexes are 2+ on the right side and 2+ on the left side.        Procedures    Assessment/Plan: She is agreeable to referral to psychiatry.  She does currently have a therapist.  She will continue with that process.  We will see her here on an as-needed basis at this point.  A total of 30 minutes was spent face-to-face with the patient today.  Of that greater than 50% of this time was spent discussing signs and symptoms of memory loss, depression and anxiety, patient education, plan of care and prognosis.         Diagnoses and all orders for this visit:    1. Anxiety and depression (Primary)  -     Ambulatory Referral to Psychiatry    2. Cognitive change           Vitor Khalil II, MD

## 2024-07-20 ENCOUNTER — HOSPITAL ENCOUNTER (EMERGENCY)
Facility: HOSPITAL | Age: 54
Discharge: HOME | End: 2024-07-20
Attending: EMERGENCY MEDICINE
Payer: MEDICAID

## 2024-07-20 ENCOUNTER — APPOINTMENT (OUTPATIENT)
Dept: GENERAL RADIOLOGY | Facility: HOSPITAL | Age: 54
End: 2024-07-20
Payer: MEDICAID

## 2024-07-20 VITALS
SYSTOLIC BLOOD PRESSURE: 118 MMHG | TEMPERATURE: 97.8 F | WEIGHT: 170 LBS | DIASTOLIC BLOOD PRESSURE: 71 MMHG | HEIGHT: 61 IN | RESPIRATION RATE: 16 BRPM | BODY MASS INDEX: 32.1 KG/M2 | HEART RATE: 77 BPM | OXYGEN SATURATION: 100 %

## 2024-07-20 DIAGNOSIS — U07.1 COVID-19: ICD-10-CM

## 2024-07-20 DIAGNOSIS — R06.00 DYSPNEA, UNSPECIFIED TYPE: Primary | ICD-10-CM

## 2024-07-20 LAB
ALBUMIN SERPL-MCNC: 4.3 G/DL (ref 3.5–5.2)
ALBUMIN/GLOB SERPL: 1.3 G/DL
ALP SERPL-CCNC: 170 U/L (ref 39–117)
ALT SERPL W P-5'-P-CCNC: 17 U/L (ref 1–33)
ANION GAP SERPL CALCULATED.3IONS-SCNC: 9.9 MMOL/L (ref 5–15)
AST SERPL-CCNC: 18 U/L (ref 1–32)
B PARAPERT DNA SPEC QL NAA+PROBE: NOT DETECTED
B PERT DNA SPEC QL NAA+PROBE: NOT DETECTED
BASOPHILS # BLD AUTO: 0.01 10*3/MM3 (ref 0–0.2)
BASOPHILS NFR BLD AUTO: 0.2 % (ref 0–1.5)
BILIRUB SERPL-MCNC: 0.2 MG/DL (ref 0–1.2)
BUN SERPL-MCNC: 8 MG/DL (ref 6–20)
BUN/CREAT SERPL: 11.4 (ref 7–25)
C PNEUM DNA NPH QL NAA+NON-PROBE: NOT DETECTED
CALCIUM SPEC-SCNC: 9.4 MG/DL (ref 8.6–10.5)
CHLORIDE SERPL-SCNC: 101 MMOL/L (ref 98–107)
CO2 SERPL-SCNC: 27.1 MMOL/L (ref 22–29)
CREAT SERPL-MCNC: 0.7 MG/DL (ref 0.57–1)
D DIMER PPP FEU-MCNC: 0.29 MCGFEU/ML (ref 0–0.51)
DEPRECATED RDW RBC AUTO: 39.6 FL (ref 37–54)
EGFRCR SERPLBLD CKD-EPI 2021: 104.9 ML/MIN/1.73
EOSINOPHIL # BLD AUTO: 0.01 10*3/MM3 (ref 0–0.4)
EOSINOPHIL NFR BLD AUTO: 0.2 % (ref 0.3–6.2)
ERYTHROCYTE [DISTWIDTH] IN BLOOD BY AUTOMATED COUNT: 13.9 % (ref 12.3–15.4)
FLUAV SUBTYP SPEC NAA+PROBE: NOT DETECTED
FLUBV RNA ISLT QL NAA+PROBE: NOT DETECTED
GLOBULIN UR ELPH-MCNC: 3.3 GM/DL
GLUCOSE SERPL-MCNC: 94 MG/DL (ref 65–99)
HADV DNA SPEC NAA+PROBE: NOT DETECTED
HCOV 229E RNA SPEC QL NAA+PROBE: NOT DETECTED
HCOV HKU1 RNA SPEC QL NAA+PROBE: NOT DETECTED
HCOV NL63 RNA SPEC QL NAA+PROBE: NOT DETECTED
HCOV OC43 RNA SPEC QL NAA+PROBE: NOT DETECTED
HCT VFR BLD AUTO: 38.8 % (ref 34–46.6)
HGB BLD-MCNC: 11.5 G/DL (ref 12–15.9)
HMPV RNA NPH QL NAA+NON-PROBE: NOT DETECTED
HPIV1 RNA ISLT QL NAA+PROBE: NOT DETECTED
HPIV2 RNA SPEC QL NAA+PROBE: NOT DETECTED
HPIV3 RNA NPH QL NAA+PROBE: NOT DETECTED
HPIV4 P GENE NPH QL NAA+PROBE: NOT DETECTED
IMM GRANULOCYTES # BLD AUTO: 0.03 10*3/MM3 (ref 0–0.05)
IMM GRANULOCYTES NFR BLD AUTO: 0.6 % (ref 0–0.5)
LYMPHOCYTES # BLD AUTO: 1.7 10*3/MM3 (ref 0.7–3.1)
LYMPHOCYTES NFR BLD AUTO: 31.7 % (ref 19.6–45.3)
M PNEUMO IGG SER IA-ACNC: NOT DETECTED
MCH RBC QN AUTO: 23.2 PG (ref 26.6–33)
MCHC RBC AUTO-ENTMCNC: 29.6 G/DL (ref 31.5–35.7)
MCV RBC AUTO: 78.2 FL (ref 79–97)
MONOCYTES # BLD AUTO: 0.44 10*3/MM3 (ref 0.1–0.9)
MONOCYTES NFR BLD AUTO: 8.2 % (ref 5–12)
NEUTROPHILS NFR BLD AUTO: 3.17 10*3/MM3 (ref 1.7–7)
NEUTROPHILS NFR BLD AUTO: 59.1 % (ref 42.7–76)
NRBC BLD AUTO-RTO: 0 /100 WBC (ref 0–0.2)
NT-PROBNP SERPL-MCNC: <36 PG/ML (ref 0–900)
PLATELET # BLD AUTO: 240 10*3/MM3 (ref 140–450)
PMV BLD AUTO: 9.7 FL (ref 6–12)
POTASSIUM SERPL-SCNC: 4 MMOL/L (ref 3.5–5.2)
PROT SERPL-MCNC: 7.6 G/DL (ref 6–8.5)
QT INTERVAL: 336 MS
QTC INTERVAL: 390 MS
RBC # BLD AUTO: 4.96 10*6/MM3 (ref 3.77–5.28)
RHINOVIRUS RNA SPEC NAA+PROBE: NOT DETECTED
RSV RNA NPH QL NAA+NON-PROBE: NOT DETECTED
SARS-COV-2 RNA NPH QL NAA+NON-PROBE: DETECTED
SODIUM SERPL-SCNC: 138 MMOL/L (ref 136–145)
TROPONIN T SERPL HS-MCNC: <6 NG/L
WBC NRBC COR # BLD AUTO: 5.36 10*3/MM3 (ref 3.4–10.8)

## 2024-07-20 PROCEDURE — 36415 COLL VENOUS BLD VENIPUNCTURE: CPT

## 2024-07-20 PROCEDURE — 93005 ELECTROCARDIOGRAM TRACING: CPT | Performed by: PHYSICIAN ASSISTANT

## 2024-07-20 PROCEDURE — 80053 COMPREHEN METABOLIC PANEL: CPT | Performed by: PHYSICIAN ASSISTANT

## 2024-07-20 PROCEDURE — 85025 COMPLETE CBC W/AUTO DIFF WBC: CPT | Performed by: PHYSICIAN ASSISTANT

## 2024-07-20 PROCEDURE — 84484 ASSAY OF TROPONIN QUANT: CPT | Performed by: PHYSICIAN ASSISTANT

## 2024-07-20 PROCEDURE — 71045 X-RAY EXAM CHEST 1 VIEW: CPT

## 2024-07-20 PROCEDURE — 25010000002 METHYLPREDNISOLONE PER 125 MG: Performed by: PHYSICIAN ASSISTANT

## 2024-07-20 PROCEDURE — 99284 EMERGENCY DEPT VISIT MOD MDM: CPT

## 2024-07-20 PROCEDURE — 0202U NFCT DS 22 TRGT SARS-COV-2: CPT | Performed by: PHYSICIAN ASSISTANT

## 2024-07-20 PROCEDURE — 96374 THER/PROPH/DIAG INJ IV PUSH: CPT

## 2024-07-20 PROCEDURE — 93010 ELECTROCARDIOGRAM REPORT: CPT | Performed by: INTERNAL MEDICINE

## 2024-07-20 PROCEDURE — 83880 ASSAY OF NATRIURETIC PEPTIDE: CPT | Performed by: PHYSICIAN ASSISTANT

## 2024-07-20 PROCEDURE — 85379 FIBRIN DEGRADATION QUANT: CPT | Performed by: PHYSICIAN ASSISTANT

## 2024-07-20 RX ORDER — PREDNISONE 50 MG/1
50 TABLET ORAL DAILY
Qty: 5 TABLET | Refills: 0 | Status: SHIPPED | OUTPATIENT
Start: 2024-07-20

## 2024-07-20 RX ORDER — METHYLPREDNISOLONE SODIUM SUCCINATE 125 MG/2ML
125 INJECTION, POWDER, LYOPHILIZED, FOR SOLUTION INTRAMUSCULAR; INTRAVENOUS ONCE
Status: COMPLETED | OUTPATIENT
Start: 2024-07-20 | End: 2024-07-20

## 2024-07-20 RX ADMIN — METHYLPREDNISOLONE SODIUM SUCCINATE 125 MG: 125 INJECTION INTRAMUSCULAR; INTRAVENOUS at 15:07

## 2024-07-20 NOTE — ED TRIAGE NOTES
Pt recently dx with covid 7/6/24. States she has hx of long covid beginning in 2022; has been following up at long covid clinic. Had been feeling better and getting back into cardio in June;but began having flu like sx July 1.

## 2024-07-20 NOTE — ED PROVIDER NOTES
MD ATTESTATION NOTE    The YARELY and I have discussed this patient's history, physical exam, and treatment plan.  I have reviewed the documentation and personally had a face to face interaction with the patient. I affirm the documentation and agree with the treatment and plan.  The attached note describes my personal findings.        SHARED APC FACE TO FACE: I performed a substantive part of the MDM during the patient's E/M visit. I personally evaluated and examined the patient. I personally made or approved the documented management plan and acknowledge its risk of complications.      Brief HPI: Patient presents for evaluation of shortness of breath.  Patient states she had COVID in 2023 and developed long COVID symptoms.  Patient states she got COVID again earlier this month.  States she is feeling short of breath.  Patient has had productive cough.  No fevers or chills.  Has had no leg swelling.  Was seen in urgent care and sent here for evaluation.    PHYSICAL EXAM  ED Triage Vitals   Temp Heart Rate Resp BP SpO2   07/20/24 1147 07/20/24 1147 07/20/24 1147 07/20/24 1216 07/20/24 1147   97.8 °F (36.6 °C) 91 16 114/86 98 %      Temp src Heart Rate Source Patient Position BP Location FiO2 (%)   07/20/24 1147 07/20/24 1147 -- -- --   Tympanic Monitor            GENERAL: no acute distress  HENT: nares patent  EYES: no scleral icterus  CV: regular rhythm, normal rate  RESPIRATORY: normal effort  ABDOMEN: soft  MUSCULOSKELETAL: no deformity  NEURO: alert, moves all extremities, follows commands  PSYCH:  calm, cooperative  SKIN: warm, dry    Vital signs and nursing notes reviewed.    Chest x-ray independently interpreted by me and shows no evidence of pneumonia    ED Course as of 07/20/24 1644   Sat Jul 20, 2024   1312 WBC: 5.36 [DC]   1312 Hemoglobin(!): 11.5 [DC]   1332 Creatinine: 0.70 [DC]   1332 Sodium: 138 [DC]   1332 Potassium: 4.0 [DC]   1332 proBNP: <36.0 [DC]   1332 HS Troponin T: <6 [DC]   1437 D-Dimer, Quant:  0.29 [DC]   1437 XR Chest 1 View  Radiology study independently interpreted by me and my findings are no pneumothorax.   [DC]   1453 COVID19(!!): Detected  Initial positive test on 7/6 [DC]   1501 Pt requesting dose of steroids prior to discharge. [DC]      ED Course User Index  [DC] Doreen Tsang PA         Plan: discharge       Juvenal Hager MD  07/20/24 0422

## 2024-07-20 NOTE — ED NOTES
Covid positive on 7/6.  She has been soa x 3 days   [Normal] : no joint swelling and grossly normal strength and tone

## 2024-07-20 NOTE — ED PROVIDER NOTES
EMERGENCY DEPARTMENT ENCOUNTER      PCP: Nina Hsieh APRN  Patient Care Team:  Nina Hsieh APRN as PCP - General (Family Medicine)   Independent Historians: Patient    HPI:  Chief Complaint: Shortness of breath  A complete HPI/ROS/PMH/PSH/SH/FH are unobtainable due to: None    Chronic or social conditions impacting patient care (social determinants of health): None    Context: Kaylie Perez is a 53 y.o. female with history of asthma, BECKIE who presents to the ED c/o acute shortness of breath over the past 3 to 4 days.  Patient reports history of long COVID last year and reports she had recently bee feeling improved.  She states she thenn contracted COVID earlier this month.  She reports productive cough but denies fever, chills, leg swelling.  She went to urgent care for further evaluation but was sent to the ER due to chest pain and concern for blood clots.  She denies any cardiac history.  She wears a CPAP machine at night.  No history of PE or DVT.    Review of prior external notes and/or external test results outside of this encounter: X-ray of the chest on 10/18/2023 showed no evidence of acute abnormality.      PAST MEDICAL HISTORY  Active Ambulatory Problems     Diagnosis Date Noted    No Active Ambulatory Problems     Resolved Ambulatory Problems     Diagnosis Date Noted    No Resolved Ambulatory Problems     Past Medical History:   Diagnosis Date    Asthma     Chronic fatigue     Chronic pain     COVID-19 02/2023    BECKEI (obstructive sleep apnea)        The patient has started, but not completed, their COVID-19 vaccination series.    PAST SURGICAL HISTORY  History reviewed. No pertinent surgical history.      FAMILY HISTORY  History reviewed. No pertinent family history.      SOCIAL HISTORY  Social History     Socioeconomic History    Marital status:    Tobacco Use    Smoking status: Never    Smokeless tobacco: Never   Vaping Use    Vaping status: Never Used   Substance and  Sexual Activity    Alcohol use: Yes     Comment: Socially    Drug use: Yes     Types: Marijuana    Sexual activity: Defer         ALLERGIES  Doxycycline, Tramadol, Erythromycin, and Sulfamethoxazole-trimethoprim        REVIEW OF SYSTEMS  Review of Systems   Constitutional:  Negative for chills and fever.   Respiratory:  Positive for shortness of breath.    Cardiovascular:  Negative for leg swelling.        All systems reviewed and negative except for those discussed in HPI.       PHYSICAL EXAM    I have reviewed the triage vital signs and nursing notes.    ED Triage Vitals   Temp Heart Rate Resp BP SpO2   07/20/24 1147 07/20/24 1147 07/20/24 1147 07/20/24 1216 07/20/24 1147   97.8 °F (36.6 °C) 91 16 114/86 98 %      Temp src Heart Rate Source Patient Position BP Location FiO2 (%)   07/20/24 1147 07/20/24 1147 -- -- --   Tympanic Monitor          Physical Exam  GENERAL: alert, no acute distress  SKIN: Warm, dry  HENT: Normocephalic, atraumatic, moist mucous membranes  EYES: no scleral icterus  CV: regular rhythm, regular rate  RESPIRATORY: normal effort, lungs clear  ABDOMEN: soft, nontender, nondistended  MUSCULOSKELETAL: no deformity, no peripheral edema, no calf tenderness  NEURO: alert, moves all extremities, follows commands          LAB RESULTS  Recent Results (from the past 24 hour(s))   ECG 12 Lead Dyspnea    Collection Time: 07/20/24 12:40 PM   Result Value Ref Range    QT Interval 336 ms    QTC Interval 390 ms   Comprehensive Metabolic Panel    Collection Time: 07/20/24 12:47 PM    Specimen: Arm, Right; Blood   Result Value Ref Range    Glucose 94 65 - 99 mg/dL    BUN 8 6 - 20 mg/dL    Creatinine 0.70 0.57 - 1.00 mg/dL    Sodium 138 136 - 145 mmol/L    Potassium 4.0 3.5 - 5.2 mmol/L    Chloride 101 98 - 107 mmol/L    CO2 27.1 22.0 - 29.0 mmol/L    Calcium 9.4 8.6 - 10.5 mg/dL    Total Protein 7.6 6.0 - 8.5 g/dL    Albumin 4.3 3.5 - 5.2 g/dL    ALT (SGPT) 17 1 - 33 U/L    AST (SGOT) 18 1 - 32 U/L    Alkaline  Phosphatase 170 (H) 39 - 117 U/L    Total Bilirubin 0.2 0.0 - 1.2 mg/dL    Globulin 3.3 gm/dL    A/G Ratio 1.3 g/dL    BUN/Creatinine Ratio 11.4 7.0 - 25.0    Anion Gap 9.9 5.0 - 15.0 mmol/L    eGFR 104.9 >60.0 mL/min/1.73   High Sensitivity Troponin T    Collection Time: 07/20/24 12:47 PM    Specimen: Arm, Right; Blood   Result Value Ref Range    HS Troponin T <6 <14 ng/L   BNP    Collection Time: 07/20/24 12:47 PM    Specimen: Arm, Right; Blood   Result Value Ref Range    proBNP <36.0 0.0 - 900.0 pg/mL   CBC Auto Differential    Collection Time: 07/20/24 12:47 PM    Specimen: Arm, Right; Blood   Result Value Ref Range    WBC 5.36 3.40 - 10.80 10*3/mm3    RBC 4.96 3.77 - 5.28 10*6/mm3    Hemoglobin 11.5 (L) 12.0 - 15.9 g/dL    Hematocrit 38.8 34.0 - 46.6 %    MCV 78.2 (L) 79.0 - 97.0 fL    MCH 23.2 (L) 26.6 - 33.0 pg    MCHC 29.6 (L) 31.5 - 35.7 g/dL    RDW 13.9 12.3 - 15.4 %    RDW-SD 39.6 37.0 - 54.0 fl    MPV 9.7 6.0 - 12.0 fL    Platelets 240 140 - 450 10*3/mm3    Neutrophil % 59.1 42.7 - 76.0 %    Lymphocyte % 31.7 19.6 - 45.3 %    Monocyte % 8.2 5.0 - 12.0 %    Eosinophil % 0.2 (L) 0.3 - 6.2 %    Basophil % 0.2 0.0 - 1.5 %    Immature Grans % 0.6 (H) 0.0 - 0.5 %    Neutrophils, Absolute 3.17 1.70 - 7.00 10*3/mm3    Lymphocytes, Absolute 1.70 0.70 - 3.10 10*3/mm3    Monocytes, Absolute 0.44 0.10 - 0.90 10*3/mm3    Eosinophils, Absolute 0.01 0.00 - 0.40 10*3/mm3    Basophils, Absolute 0.01 0.00 - 0.20 10*3/mm3    Immature Grans, Absolute 0.03 0.00 - 0.05 10*3/mm3    nRBC 0.0 0.0 - 0.2 /100 WBC   D-dimer, Quantitative    Collection Time: 07/20/24 12:47 PM    Specimen: Arm, Right; Blood   Result Value Ref Range    D-Dimer, Quantitative 0.29 0.00 - 0.51 MCGFEU/mL   Respiratory Panel PCR w/COVID-19(SARS-CoV-2) ELMO/SANJANA/SOFIA/PAD/COR/GRACY In-House, NP Swab in UTM/VTM, 2 HR TAT - Swab, Nasopharynx    Collection Time: 07/20/24 12:52 PM    Specimen: Nasopharynx; Swab   Result Value Ref Range    ADENOVIRUS, PCR Not Detected  Not Detected    Coronavirus 229E Not Detected Not Detected    Coronavirus HKU1 Not Detected Not Detected    Coronavirus NL63 Not Detected Not Detected    Coronavirus OC43 Not Detected Not Detected    COVID19 Detected (C) Not Detected - Ref. Range    Human Metapneumovirus Not Detected Not Detected    Human Rhinovirus/Enterovirus Not Detected Not Detected    Influenza A PCR Not Detected Not Detected    Influenza B PCR Not Detected Not Detected    Parainfluenza Virus 1 Not Detected Not Detected    Parainfluenza Virus 2 Not Detected Not Detected    Parainfluenza Virus 3 Not Detected Not Detected    Parainfluenza Virus 4 Not Detected Not Detected    RSV, PCR Not Detected Not Detected    Bordetella pertussis pcr Not Detected Not Detected    Bordetella parapertussis PCR Not Detected Not Detected    Chlamydophila pneumoniae PCR Not Detected Not Detected    Mycoplasma pneumo by PCR Not Detected Not Detected       Ordered the above labs and independently reviewed and interpreted the results.        RADIOLOGY  XR Chest 1 View    Result Date: 7/20/2024  XR CHEST 1 VW-  INDICATION: Shortness of breath  COMPARISON: None available      Right lower lobe linear atelectasis/scarring. No other focal consolidation. No pleural effusion or pneumothorax. Normal size cardiomediastinal silhouette. No focal osseous abnormality.    This report was finalized on 7/20/2024 12:40 PM by Dr. Raul Rojas M.D on Workstation: [id number]       I ordered the above noted radiological studies. Independently reviewed and interpreted by me.  See dictation for official radiology interpretation.      PROCEDURES    Procedures      MEDICATIONS GIVEN IN ER    Medications   methylPREDNISolone sodium succinate (SOLU-Medrol) injection 125 mg (has no administration in time range)         PROGRESS, DATA ANALYSIS, CONSULTS, AND MEDICAL DECISION MAKING    All labs have been independently reviewed and interpreted by me.  All radiology studies have been independently  reviewed and interpreted by me and discussed with radiologist dictating the report.   EKG's independently reviewed and interpreted by me.  Discussion below represents my analysis of pertinent findings related to patient's condition, differential diagnosis, treatment plan and final disposition.    My differential diagnosis for dyspnea includes but is not limited to:    Asthma, COPD, pneumonia, pulmonary embolism, acute respiratory distress syndrome, pneumothorax, hemothorax, pleural effusion, pulmonary fibrosis, congestive heart failure, myocardial infarction, DKA, uremia, acidosis, sepsis, anemia, drug related, hyperventilation, CNS disease, inhalation exposure, airway obstruction, aspiration, electrolyte abnormalities, myasthenia gravis, panic attack, anaphylaxis      ED Course as of 07/20/24 1501   Sat Jul 20, 2024   1312 WBC: 5.36 [DC]   1312 Hemoglobin(!): 11.5 [DC]   1332 Creatinine: 0.70 [DC]   1332 Sodium: 138 [DC]   1332 Potassium: 4.0 [DC]   1332 proBNP: <36.0 [DC]   1332 HS Troponin T: <6 [DC]   1437 D-Dimer, Quant: 0.29 [DC]   1437 XR Chest 1 View  Radiology study independently interpreted by me and my findings are no pneumothorax.   [DC]   1453 COVID19(!!): Detected  Initial positive test on 7/6 [DC]   1501 Pt requesting dose of steroids prior to discharge. [DC]      ED Course User Index  [DC] Doreen Tsang PA             AS OF 15:01 EDT VITALS:    BP - 124/90  HR - 75  TEMP - 97.8 °F (36.6 °C) (Tympanic)  O2 SATS - 100%        DIAGNOSIS  Final diagnoses:   Dyspnea, unspecified type   COVID-19         DISPOSITION  ED Disposition       ED Disposition   Discharge    Condition   Stable    Comment   --                  Note Disclaimer: At [facility], we believe that sharing information builds trust and better relationships. You are receiving this note because you recently visited [facility]. It is possible you will see health information before a provider has talked with you about it. This kind of  information can be easy to misunderstand. To help you fully understand what it means for your health, we urge you to discuss this note with your provider.         Doreen Tsang PA  07/20/24 8330

## 2024-08-29 ENCOUNTER — HOSPITAL ENCOUNTER (EMERGENCY)
Facility: HOSPITAL | Age: 54
Discharge: HOME | End: 2024-08-29
Attending: EMERGENCY MEDICINE
Payer: MEDICAID

## 2024-08-29 ENCOUNTER — APPOINTMENT (OUTPATIENT)
Dept: CT IMAGING | Facility: HOSPITAL | Age: 54
End: 2024-08-29
Payer: MEDICAID

## 2024-08-29 VITALS
BODY MASS INDEX: 31.53 KG/M2 | RESPIRATION RATE: 16 BRPM | SYSTOLIC BLOOD PRESSURE: 135 MMHG | TEMPERATURE: 98.4 F | WEIGHT: 167 LBS | OXYGEN SATURATION: 100 % | HEART RATE: 80 BPM | DIASTOLIC BLOOD PRESSURE: 93 MMHG | HEIGHT: 61 IN

## 2024-08-29 DIAGNOSIS — N20.0 LEFT NEPHROLITHIASIS: ICD-10-CM

## 2024-08-29 DIAGNOSIS — K76.9 LESION OF LIVER: ICD-10-CM

## 2024-08-29 DIAGNOSIS — R10.32 LLQ ABDOMINAL PAIN: Primary | ICD-10-CM

## 2024-08-29 DIAGNOSIS — D17.79 LIPOMA OF OTHER SPECIFIED SITES: ICD-10-CM

## 2024-08-29 LAB
ALBUMIN SERPL-MCNC: 4.3 G/DL (ref 3.5–5.2)
ALBUMIN/GLOB SERPL: 1.3 G/DL
ALP SERPL-CCNC: 163 U/L (ref 39–117)
ALT SERPL W P-5'-P-CCNC: 15 U/L (ref 1–33)
ANION GAP SERPL CALCULATED.3IONS-SCNC: 9 MMOL/L (ref 5–15)
AST SERPL-CCNC: 24 U/L (ref 1–32)
BASOPHILS # BLD AUTO: 0.01 10*3/MM3 (ref 0–0.2)
BASOPHILS NFR BLD AUTO: 0.2 % (ref 0–1.5)
BILIRUB SERPL-MCNC: 0.4 MG/DL (ref 0–1.2)
BILIRUB UR QL STRIP: NEGATIVE
BUN SERPL-MCNC: 5 MG/DL (ref 6–20)
BUN/CREAT SERPL: 6 (ref 7–25)
CALCIUM SPEC-SCNC: 9.9 MG/DL (ref 8.6–10.5)
CHLORIDE SERPL-SCNC: 102 MMOL/L (ref 98–107)
CLARITY UR: ABNORMAL
CO2 SERPL-SCNC: 28 MMOL/L (ref 22–29)
COLOR UR: YELLOW
CREAT SERPL-MCNC: 0.83 MG/DL (ref 0.57–1)
DEPRECATED RDW RBC AUTO: 38.1 FL (ref 37–54)
EGFRCR SERPLBLD CKD-EPI 2021: 85.5 ML/MIN/1.73
EOSINOPHIL # BLD AUTO: 0.01 10*3/MM3 (ref 0–0.4)
EOSINOPHIL NFR BLD AUTO: 0.2 % (ref 0.3–6.2)
ERYTHROCYTE [DISTWIDTH] IN BLOOD BY AUTOMATED COUNT: 13.5 % (ref 12.3–15.4)
GLOBULIN UR ELPH-MCNC: 3.3 GM/DL
GLUCOSE SERPL-MCNC: 101 MG/DL (ref 65–99)
GLUCOSE UR STRIP-MCNC: NEGATIVE MG/DL
HCT VFR BLD AUTO: 40.1 % (ref 34–46.6)
HGB BLD-MCNC: 12.3 G/DL (ref 12–15.9)
HGB UR QL STRIP.AUTO: NEGATIVE
IMM GRANULOCYTES # BLD AUTO: 0.02 10*3/MM3 (ref 0–0.05)
IMM GRANULOCYTES NFR BLD AUTO: 0.5 % (ref 0–0.5)
KETONES UR QL STRIP: ABNORMAL
LEUKOCYTE ESTERASE UR QL STRIP.AUTO: NEGATIVE
LIPASE SERPL-CCNC: 15 U/L (ref 13–60)
LYMPHOCYTES # BLD AUTO: 1.59 10*3/MM3 (ref 0.7–3.1)
LYMPHOCYTES NFR BLD AUTO: 36.5 % (ref 19.6–45.3)
MCH RBC QN AUTO: 24 PG (ref 26.6–33)
MCHC RBC AUTO-ENTMCNC: 30.7 G/DL (ref 31.5–35.7)
MCV RBC AUTO: 78.2 FL (ref 79–97)
MONOCYTES # BLD AUTO: 0.46 10*3/MM3 (ref 0.1–0.9)
MONOCYTES NFR BLD AUTO: 10.6 % (ref 5–12)
NEUTROPHILS NFR BLD AUTO: 2.27 10*3/MM3 (ref 1.7–7)
NEUTROPHILS NFR BLD AUTO: 52 % (ref 42.7–76)
NITRITE UR QL STRIP: NEGATIVE
NRBC BLD AUTO-RTO: 0 /100 WBC (ref 0–0.2)
PH UR STRIP.AUTO: 5.5 [PH] (ref 5–8)
PLATELET # BLD AUTO: 217 10*3/MM3 (ref 140–450)
PMV BLD AUTO: 9.9 FL (ref 6–12)
POTASSIUM SERPL-SCNC: 4.1 MMOL/L (ref 3.5–5.2)
PROT SERPL-MCNC: 7.6 G/DL (ref 6–8.5)
PROT UR QL STRIP: ABNORMAL
RBC # BLD AUTO: 5.13 10*6/MM3 (ref 3.77–5.28)
SODIUM SERPL-SCNC: 139 MMOL/L (ref 136–145)
SP GR UR STRIP: 1.02 (ref 1–1.03)
UROBILINOGEN UR QL STRIP: ABNORMAL
WBC NRBC COR # BLD AUTO: 4.36 10*3/MM3 (ref 3.4–10.8)

## 2024-08-29 PROCEDURE — 96375 TX/PRO/DX INJ NEW DRUG ADDON: CPT

## 2024-08-29 PROCEDURE — 83690 ASSAY OF LIPASE: CPT | Performed by: PHYSICIAN ASSISTANT

## 2024-08-29 PROCEDURE — 74176 CT ABD & PELVIS W/O CONTRAST: CPT

## 2024-08-29 PROCEDURE — 25010000002 ONDANSETRON PER 1 MG: Performed by: EMERGENCY MEDICINE

## 2024-08-29 PROCEDURE — 85025 COMPLETE CBC W/AUTO DIFF WBC: CPT | Performed by: PHYSICIAN ASSISTANT

## 2024-08-29 PROCEDURE — 96374 THER/PROPH/DIAG INJ IV PUSH: CPT

## 2024-08-29 PROCEDURE — 25010000002 MORPHINE PER 10 MG: Performed by: EMERGENCY MEDICINE

## 2024-08-29 PROCEDURE — 25810000003 LACTATED RINGERS SOLUTION: Performed by: EMERGENCY MEDICINE

## 2024-08-29 PROCEDURE — 81003 URINALYSIS AUTO W/O SCOPE: CPT | Performed by: PHYSICIAN ASSISTANT

## 2024-08-29 PROCEDURE — 99284 EMERGENCY DEPT VISIT MOD MDM: CPT

## 2024-08-29 PROCEDURE — 80053 COMPREHEN METABOLIC PANEL: CPT | Performed by: PHYSICIAN ASSISTANT

## 2024-08-29 RX ORDER — ONDANSETRON 2 MG/ML
4 INJECTION INTRAMUSCULAR; INTRAVENOUS ONCE
Status: COMPLETED | OUTPATIENT
Start: 2024-08-29 | End: 2024-08-29

## 2024-08-29 RX ORDER — ONDANSETRON 4 MG/1
4-8 TABLET, ORALLY DISINTEGRATING ORAL EVERY 8 HOURS PRN
Qty: 15 TABLET | Refills: 0 | Status: SHIPPED | OUTPATIENT
Start: 2024-08-29

## 2024-08-29 RX ORDER — MORPHINE SULFATE 2 MG/ML
4 INJECTION, SOLUTION INTRAMUSCULAR; INTRAVENOUS ONCE
Status: COMPLETED | OUTPATIENT
Start: 2024-08-29 | End: 2024-08-29

## 2024-08-29 RX ORDER — DICYCLOMINE HCL 20 MG
20 TABLET ORAL EVERY 6 HOURS PRN
Qty: 12 TABLET | Refills: 0 | Status: SHIPPED | OUTPATIENT
Start: 2024-08-29

## 2024-08-29 RX ADMIN — ONDANSETRON 4 MG: 2 INJECTION, SOLUTION INTRAMUSCULAR; INTRAVENOUS at 09:58

## 2024-08-29 RX ADMIN — SODIUM CHLORIDE, POTASSIUM CHLORIDE, SODIUM LACTATE AND CALCIUM CHLORIDE 1000 ML: 600; 310; 30; 20 INJECTION, SOLUTION INTRAVENOUS at 09:56

## 2024-08-29 RX ADMIN — MORPHINE SULFATE 4 MG: 2 INJECTION, SOLUTION INTRAMUSCULAR; INTRAVENOUS at 09:58

## 2024-08-29 NOTE — ED NOTES
Pt has abd pain and lower back pain since 8/17.  She has hx diverticulitis. Pt c/o low back pain and LLQ abd pain. Pt was prescribed new medication clomipramine on 8/17 and that's when she started having abd pain.

## 2024-08-29 NOTE — ED PROVIDER NOTES
MD ATTESTATION NOTE    SHARED VISIT: This visit was performed by BOTH a physician and an APC. The substantive portion of the medical decision making was performed by this attesting physician who made or approved the management plan and takes responsibility for patient management. All studies in the APC note (if performed) were independently interpreted by me.     The YARELY and I have discussed this patient's history, physical exam, and treatment plan.  I have reviewed the documentation and affirm the documentation and agree with the treatment and plan.  The attached note describes my personal findings.      Independent Historians: Patient    A complete HPI/ROS/PMH/PSH/SH/FH are unobtainable due to: None    Chronic or social conditions impacting patient care (social determinants of health): None    Kaylie Perez is a 53 y.o. female who presents to the ED c/o acute left flank pain.  Patient started with numerous loose stools about 10 days ago.  Those loose stools improved after 3 to 4 days.  Patient denies any nausea or vomiting.  Patient's left flank and left-sided abdominal pain is gradually increased.  She did go to urgent care 3 days ago and was prescribed Augmentin for presumed diverticulitis due to her history.  Patient with no prior abdominal surgeries.          On exam:  GENERAL: alert, no acute distress  SKIN: Warm, dry  HENT: Normocephalic, atraumatic  EYES: no scleral icterus  RESPIRATORY: Relaxed breathing  ABDOMEN: soft, diffuse lower abdominal and left lower quadrant tenderness to palpation with no rebound and no guarding, nondistended  MUSCULOSKELETAL: no deformity  NEURO: alert, moves all extremities, follows commands                                                             Labs  Recent Results (from the past 24 hour(s))   Urinalysis With Microscopic If Indicated (No Culture) - Urine, Clean Catch    Collection Time: 08/29/24  9:39 AM    Specimen: Urine, Clean Catch   Result Value Ref Range     Color, UA Yellow Yellow, Straw    Appearance, UA Cloudy (A) Clear    pH, UA 5.5 5.0 - 8.0    Specific Gravity, UA 1.024 1.005 - 1.030    Glucose, UA Negative Negative    Ketones, UA 15 mg/dL (1+) (A) Negative    Bilirubin, UA Negative Negative    Blood, UA Negative Negative    Protein, UA Trace (A) Negative    Leuk Esterase, UA Negative Negative    Nitrite, UA Negative Negative    Urobilinogen, UA 0.2 E.U./dL 0.2 - 1.0 E.U./dL   Comprehensive Metabolic Panel    Collection Time: 08/29/24  9:46 AM    Specimen: Arm, Left; Blood   Result Value Ref Range    Glucose 101 (H) 65 - 99 mg/dL    BUN 5 (L) 6 - 20 mg/dL    Creatinine 0.83 0.57 - 1.00 mg/dL    Sodium 139 136 - 145 mmol/L    Potassium 4.1 3.5 - 5.2 mmol/L    Chloride 102 98 - 107 mmol/L    CO2 28.0 22.0 - 29.0 mmol/L    Calcium 9.9 8.6 - 10.5 mg/dL    Total Protein 7.6 6.0 - 8.5 g/dL    Albumin 4.3 3.5 - 5.2 g/dL    ALT (SGPT) 15 1 - 33 U/L    AST (SGOT) 24 1 - 32 U/L    Alkaline Phosphatase 163 (H) 39 - 117 U/L    Total Bilirubin 0.4 0.0 - 1.2 mg/dL    Globulin 3.3 gm/dL    A/G Ratio 1.3 g/dL    BUN/Creatinine Ratio 6.0 (L) 7.0 - 25.0    Anion Gap 9.0 5.0 - 15.0 mmol/L    eGFR 85.5 >60.0 mL/min/1.73   Lipase    Collection Time: 08/29/24  9:46 AM    Specimen: Arm, Left; Blood   Result Value Ref Range    Lipase 15 13 - 60 U/L   CBC Auto Differential    Collection Time: 08/29/24  9:46 AM    Specimen: Arm, Left; Blood   Result Value Ref Range    WBC 4.36 3.40 - 10.80 10*3/mm3    RBC 5.13 3.77 - 5.28 10*6/mm3    Hemoglobin 12.3 12.0 - 15.9 g/dL    Hematocrit 40.1 34.0 - 46.6 %    MCV 78.2 (L) 79.0 - 97.0 fL    MCH 24.0 (L) 26.6 - 33.0 pg    MCHC 30.7 (L) 31.5 - 35.7 g/dL    RDW 13.5 12.3 - 15.4 %    RDW-SD 38.1 37.0 - 54.0 fl    MPV 9.9 6.0 - 12.0 fL    Platelets 217 140 - 450 10*3/mm3    Neutrophil % 52.0 42.7 - 76.0 %    Lymphocyte % 36.5 19.6 - 45.3 %    Monocyte % 10.6 5.0 - 12.0 %    Eosinophil % 0.2 (L) 0.3 - 6.2 %    Basophil % 0.2 0.0 - 1.5 %    Immature Grans  % 0.5 0.0 - 0.5 %    Neutrophils, Absolute 2.27 1.70 - 7.00 10*3/mm3    Lymphocytes, Absolute 1.59 0.70 - 3.10 10*3/mm3    Monocytes, Absolute 0.46 0.10 - 0.90 10*3/mm3    Eosinophils, Absolute 0.01 0.00 - 0.40 10*3/mm3    Basophils, Absolute 0.01 0.00 - 0.20 10*3/mm3    Immature Grans, Absolute 0.02 0.00 - 0.05 10*3/mm3    nRBC 0.0 0.0 - 0.2 /100 WBC       Radiology  CT Abdomen Pelvis Without Contrast    Result Date: 8/29/2024  CT ABDOMEN PELVIS WO CONTRAST-  INDICATION: Left lower quadrant abdominal pain  COMPARISON: None  TECHNIQUE: Routine CT abdomen and pelvis without IV contrast. Coronal and sagittal reformats. Radiation dose reduction techniques were utilized, including automated exposure control and exposure modulation based on body size.  FINDINGS:  Lung bases: Subsegmental atelectasis at the lung bases.  ABDOMEN: Couple low attenuating liver lesions measuring less than 1 cm are too small to characterize, often biliary cystic hematomas. Normal gallbladder. No biliary ductal dilatation. Spleen is normal in size. No pancreatic mass or pancreatic ductal dilatation seen. No adrenal nodules. Nonobstructing nephrolithiasis in the inferior pole left kidney, series 2, axial mage 52, measures 2 mm. No hydronephrosis or ureterolithiasis.  Pelvis: Underdistended bladder. No bladder calculus. Anteverted uterus. Fallopian tube micro inserts. No adnexal mass.  Bowel: Lipoma seen in the D2/D3 segment of the duodenum, series 2, axial mage 56, measuring 1.9 cm. No obstruction. Colonic diverticulosis. Normal appendix.  Abdominal wall: Rectus diastases. Pelvic wall scarring.  Retroperitoneum: No lymphadenopathy.  Vasculature: No abdominal aortic aneurysm.  Osseous structures: Sclerotic lesion in the left ilium, appears solitary, suspect a bone island in a patient without a known malignancy..       1. No acute findings identified in the abdomen or pelvis. 2. Colonic diverticulosis. 3. Nonobstructing left nephrolithiasis  This  report was finalized on 8/29/2024 10:07 AM by Dr. Jett Lang M.D on Workstation: [id number]       Medical Decision Making:  ED Course as of 08/29/24 1525   Thu Aug 29, 2024   1021 WBC: 4.36 [KA]   1022 Hemoglobin: 12.3 [KA]   1022 Glucose(!): 101 [KA]   1022 Creatinine: 0.83 [KA]   1022 Lipase: 15 [KA]   1103 Reassessed the patient, counseled her on all of her lab and imaging results including all incidental findings.  Counseled her on the importance of follow-up with PCP to follow-up on her current symptoms and incidental finding.  Ultimately, no acute intra-abdominal findings noted on her CT scan, she has a history of chronically elevated alkaline phosphatase, no evidence of biliary obstruction.  Lipase normal, white blood cell count normal.  I think is reasonable with the location of her pain and history of diverticulitis for her to continue the Augmentin for total of 10-day course.  Recommend she call her PCP today to schedule close follow-up.  Return to the emergency department for any new or worsening symptoms or any concerns.  I did prescribe her some Zofran as well as Bentyl for symptom treatment. [KA]      ED Course User Index  [KA] Naya Jordan PA-C       MDM: The differential diagnosis for this patient includes: appendicitis, pancreatitis, cholecystitis/biliary colic, PUD, gastritis, pneumonia, ureteral stone, sbo, hernia, colitis, diverticulitis, AAA, malignancy, gastroenteritis, food intolerances. Abdominal exam is without peritoneal signs. There is no evidence of acute abdomen at this time. The patient is well appearing. I have a low suspicion for vascular catastrophe, bowel obstruction or viscus perforation. Plan serum labs, urinalysis, pain control, IMAGING CT abdomen pelvis, and serial reassessment.    Procedures:  Procedures        PPE: I followed hospital protocols for proper PPE based on patient presentation including use of N95 mask for suspected infectious respiratory conditions.   Proper hand hygiene was performed both before and after the patient encounter.          Diagnosis  Final diagnoses:   LLQ abdominal pain   Lesion of liver   Left nephrolithiasis   Lipoma of other specified sites       Note Disclaimer: At [facility], we believe that sharing information builds trust and better relationships. You are receiving this note because you recently visited [facility]. It is possible you will see health information before a provider has talked with you about it. This kind of information can be easy to misunderstand. To help you fully understand what it means for your health, we urge you to discuss this note with your provider.       Aparna Lovelace MD  08/30/24 7632

## 2024-08-29 NOTE — ED PROVIDER NOTES
EMERGENCY DEPARTMENT ENCOUNTER  Room Number:  04/04  PCP: Nina Hsieh APRN  Independent Historians: Patient      HPI:  Chief Complaint: had concerns including Abdominal Pain and Back Pain.     A complete HPI/ROS/PMH/PSH/SH/FH are unobtainable due to: None    Chronic or social conditions impacting patient care (Social Determinants of Health): None      Context: The patient is a 53 y.o. female with a medical history of asthma, chronic fatigue, BECKIE left lower quadrant and left lower back samuel.  She states who presents to the ED c/o acute left lower abdominal and left low back pain.  Started having symptoms about 10 days ago when she had numerous watery stools for a few days.  Her diarrhea is gradually improved and she states it is now soft, but formed.  She has not had any nausea or vomiting.  Her left-sided abdominal pain has gradually increased.  States she went to urgent care about it 3 days ago and was prescribed Augmentin, has been taking it without relief.  She has a history of diverticulitis, no prior abdominal surgeries.  No fevers hematuria dysuria urinary frequency.      Review of prior external notes (non-ED) -and- Review of prior external test results outside of this encounter:  Office visit urgent care 8/26/2020 for for left lower abdominal pain and yellow diarrhea.  Prescribed Augmentin 875-125 mg twice daily x 14 days.  KUB that day showed a normal gas pattern.        PAST MEDICAL HISTORY  Active Ambulatory Problems     Diagnosis Date Noted    No Active Ambulatory Problems     Resolved Ambulatory Problems     Diagnosis Date Noted    No Resolved Ambulatory Problems     Past Medical History:   Diagnosis Date    Asthma     Chronic fatigue     Chronic pain     COVID-19 02/2023    BECKIE (obstructive sleep apnea)          PAST SURGICAL HISTORY  History reviewed. No pertinent surgical history.      FAMILY HISTORY  History reviewed. No pertinent family history.      SOCIAL HISTORY  Social History      Socioeconomic History    Marital status:    Tobacco Use    Smoking status: Never    Smokeless tobacco: Never   Vaping Use    Vaping status: Never Used   Substance and Sexual Activity    Alcohol use: Yes     Comment: Socially    Drug use: Yes     Types: Marijuana    Sexual activity: Defer         ALLERGIES  Clomipramine, Doxycycline, Flagyl [metronidazole], Tramadol, Erythromycin, and Sulfamethoxazole-trimethoprim      REVIEW OF SYSTEMS  Review of Systems  Included in HPI  All systems reviewed and negative except for those discussed in HPI.      PHYSICAL EXAM    I have reviewed the triage vital signs and nursing notes.    ED Triage Vitals   Temp Heart Rate Resp BP SpO2   08/29/24 0900 08/29/24 0900 08/29/24 0900 08/29/24 0911 08/29/24 0900   98.4 °F (36.9 °C) 111 16 135/93 97 %      Temp src Heart Rate Source Patient Position BP Location FiO2 (%)   08/29/24 0900 08/29/24 0900 08/29/24 0911 08/29/24 0911 --   Tympanic Monitor Lying Right arm        Physical Exam  GENERAL: alert, no acute distress  SKIN: Warm, dry  HENT: Normocephalic, atraumatic  EYES: no scleral icterus  CV: regular rhythm, regular rate  RESPIRATORY: normal effort, lungs clear  ABDOMEN: nondistended soft, tender in the suprapubic and left lower quadrant, no guarding or rigidity, bowel sounds present  MUSCULOSKELETAL: no deformity  NEURO: alert, moves all extremities, follows commands            LAB RESULTS  Recent Results (from the past 24 hour(s))   Urinalysis With Microscopic If Indicated (No Culture) - Urine, Clean Catch    Collection Time: 08/29/24  9:39 AM    Specimen: Urine, Clean Catch   Result Value Ref Range    Color, UA Yellow Yellow, Straw    Appearance, UA Cloudy (A) Clear    pH, UA 5.5 5.0 - 8.0    Specific Gravity, UA 1.024 1.005 - 1.030    Glucose, UA Negative Negative    Ketones, UA 15 mg/dL (1+) (A) Negative    Bilirubin, UA Negative Negative    Blood, UA Negative Negative    Protein, UA Trace (A) Negative    Leuk  Esterase, UA Negative Negative    Nitrite, UA Negative Negative    Urobilinogen, UA 0.2 E.U./dL 0.2 - 1.0 E.U./dL   Comprehensive Metabolic Panel    Collection Time: 08/29/24  9:46 AM    Specimen: Arm, Left; Blood   Result Value Ref Range    Glucose 101 (H) 65 - 99 mg/dL    BUN 5 (L) 6 - 20 mg/dL    Creatinine 0.83 0.57 - 1.00 mg/dL    Sodium 139 136 - 145 mmol/L    Potassium 4.1 3.5 - 5.2 mmol/L    Chloride 102 98 - 107 mmol/L    CO2 28.0 22.0 - 29.0 mmol/L    Calcium 9.9 8.6 - 10.5 mg/dL    Total Protein 7.6 6.0 - 8.5 g/dL    Albumin 4.3 3.5 - 5.2 g/dL    ALT (SGPT) 15 1 - 33 U/L    AST (SGOT) 24 1 - 32 U/L    Alkaline Phosphatase 163 (H) 39 - 117 U/L    Total Bilirubin 0.4 0.0 - 1.2 mg/dL    Globulin 3.3 gm/dL    A/G Ratio 1.3 g/dL    BUN/Creatinine Ratio 6.0 (L) 7.0 - 25.0    Anion Gap 9.0 5.0 - 15.0 mmol/L    eGFR 85.5 >60.0 mL/min/1.73   Lipase    Collection Time: 08/29/24  9:46 AM    Specimen: Arm, Left; Blood   Result Value Ref Range    Lipase 15 13 - 60 U/L   CBC Auto Differential    Collection Time: 08/29/24  9:46 AM    Specimen: Arm, Left; Blood   Result Value Ref Range    WBC 4.36 3.40 - 10.80 10*3/mm3    RBC 5.13 3.77 - 5.28 10*6/mm3    Hemoglobin 12.3 12.0 - 15.9 g/dL    Hematocrit 40.1 34.0 - 46.6 %    MCV 78.2 (L) 79.0 - 97.0 fL    MCH 24.0 (L) 26.6 - 33.0 pg    MCHC 30.7 (L) 31.5 - 35.7 g/dL    RDW 13.5 12.3 - 15.4 %    RDW-SD 38.1 37.0 - 54.0 fl    MPV 9.9 6.0 - 12.0 fL    Platelets 217 140 - 450 10*3/mm3    Neutrophil % 52.0 42.7 - 76.0 %    Lymphocyte % 36.5 19.6 - 45.3 %    Monocyte % 10.6 5.0 - 12.0 %    Eosinophil % 0.2 (L) 0.3 - 6.2 %    Basophil % 0.2 0.0 - 1.5 %    Immature Grans % 0.5 0.0 - 0.5 %    Neutrophils, Absolute 2.27 1.70 - 7.00 10*3/mm3    Lymphocytes, Absolute 1.59 0.70 - 3.10 10*3/mm3    Monocytes, Absolute 0.46 0.10 - 0.90 10*3/mm3    Eosinophils, Absolute 0.01 0.00 - 0.40 10*3/mm3    Basophils, Absolute 0.01 0.00 - 0.20 10*3/mm3    Immature Grans, Absolute 0.02 0.00 - 0.05  10*3/mm3    nRBC 0.0 0.0 - 0.2 /100 WBC         RADIOLOGY  CT Abdomen Pelvis Without Contrast    Result Date: 8/29/2024  CT ABDOMEN PELVIS WO CONTRAST-  INDICATION: Left lower quadrant abdominal pain  COMPARISON: None  TECHNIQUE: Routine CT abdomen and pelvis without IV contrast. Coronal and sagittal reformats. Radiation dose reduction techniques were utilized, including automated exposure control and exposure modulation based on body size.  FINDINGS:  Lung bases: Subsegmental atelectasis at the lung bases.  ABDOMEN: Couple low attenuating liver lesions measuring less than 1 cm are too small to characterize, often biliary cystic hematomas. Normal gallbladder. No biliary ductal dilatation. Spleen is normal in size. No pancreatic mass or pancreatic ductal dilatation seen. No adrenal nodules. Nonobstructing nephrolithiasis in the inferior pole left kidney, series 2, axial mage 52, measures 2 mm. No hydronephrosis or ureterolithiasis.  Pelvis: Underdistended bladder. No bladder calculus. Anteverted uterus. Fallopian tube micro inserts. No adnexal mass.  Bowel: Lipoma seen in the D2/D3 segment of the duodenum, series 2, axial mage 56, measuring 1.9 cm. No obstruction. Colonic diverticulosis. Normal appendix.  Abdominal wall: Rectus diastases. Pelvic wall scarring.  Retroperitoneum: No lymphadenopathy.  Vasculature: No abdominal aortic aneurysm.  Osseous structures: Sclerotic lesion in the left ilium, appears solitary, suspect a bone island in a patient without a known malignancy..       1. No acute findings identified in the abdomen or pelvis. 2. Colonic diverticulosis. 3. Nonobstructing left nephrolithiasis  This report was finalized on 8/29/2024 10:07 AM by Dr. Jett Lang M.D on Workstation: [id number]         MEDICATIONS GIVEN IN ER  Medications   morphine injection 4 mg (4 mg Intravenous Given 8/29/24 0958)   ondansetron (ZOFRAN) injection 4 mg (4 mg Intravenous Given 8/29/24 0958)   lactated ringers bolus  1,000 mL (1,000 mL Intravenous New Bag 8/29/24 0956)         ORDERS PLACED DURING THIS VISIT:  Orders Placed This Encounter   Procedures    CT Abdomen Pelvis Without Contrast    Comprehensive Metabolic Panel    Lipase    Urinalysis With Microscopic If Indicated (No Culture) - Urine, Clean Catch    CBC Auto Differential    CBC & Differential         OUTPATIENT MEDICATION MANAGEMENT:  No current Epic-ordered facility-administered medications on file.     Current Outpatient Medications Ordered in Epic   Medication Sig Dispense Refill    albuterol sulfate  (90 Base) MCG/ACT inhaler INHALE 1 TO 2 PUFFS BY MOUTH EVERY 4 TO 6 HOURS AS NEEDED FOR SHORTNESS OF BREATH OR WHEEZING      Breztri Aerosphere 160-9-4.8 MCG/ACT aerosol inhaler 2 puffs 2 (Two) Times a Day.      cholecalciferol (VITAMIN D3) 1.25 MG (81969 UT) capsule Take 1 capsule by mouth Every 7 (Seven) Days.      clotrimazole-betamethasone (LOTRISONE) 1-0.05 % cream       cyclobenzaprine (FLEXERIL) 10 MG tablet As Needed.      diazePAM (VALIUM) 5 MG tablet TAKE 1 TABLET BY MOUTH TWICE DAILY AS NEEDED FOR SEVERE ANIXETY      dicyclomine (BENTYL) 20 MG tablet Take 1 tablet by mouth Every 6 (Six) Hours As Needed for Abdominal Cramping. 12 tablet 0    hydrOXYzine (ATARAX) 50 MG tablet Take 1 tablet by mouth Every 8 (Eight) Hours As Needed. for anxiety (Patient not taking: Reported on 3/20/2024)      magnesium oxide (MAG-OX) 400 MG tablet magnesium oxide 400 mg (241.3 mg magnesium) tablet   TAKE 2 TABLETS BY MOUTH EVERY DAY AT BEDTIME FOR 30 DAYS      methocarbamol (ROBAXIN) 750 MG tablet Take 1 tablet by mouth 3 times a day.      minocycline (MINOCIN,DYNACIN) 50 MG capsule As Needed. (Patient not taking: Reported on 3/20/2024)      ondansetron ODT (ZOFRAN-ODT) 4 MG disintegrating tablet Take 1-2 tablets by mouth Every 8 (Eight) Hours As Needed for Nausea or Vomiting. 15 tablet 0    predniSONE (DELTASONE) 50 MG tablet Take 1 tablet by mouth Daily. 5 tablet 0          PROCEDURES  Procedures            PROGRESS, DATA ANALYSIS, CONSULTS, AND MEDICAL DECISION MAKING  All labs have been independently interpreted by me.  All radiology studies have been reviewed by me. All EKG's have been independently viewed and interpreted by me.  Discussion below represents my analysis of pertinent findings related to patient's condition, differential diagnosis, treatment plan and final disposition.    DIFFERENTIAL    Differential diagnosis includes but is not limited to:  - hepatobiliary pathology such as cholecystitis, cholangitis, and symptomatic cholelithiasis  - Pancreatitis  - Dyspepsia  - Small bowel obstruction  - Appendicitis  - Diverticulitis  - UTI including pyelonephritis  - Ureteral stone  - Zoster  - Colitis, including infectious and ischemic  - Atypical ACS      Clinical Scores:                  ED Course as of 08/29/24 1104   Thu Aug 29, 2024   1021 WBC: 4.36 [KA]   1022 Hemoglobin: 12.3 [KA]   1022 Glucose(!): 101 [KA]   1022 Creatinine: 0.83 [KA]   1022 Lipase: 15 [KA]   1103 Reassessed the patient, counseled her on all of her lab and imaging results including all incidental findings.  Counseled her on the importance of follow-up with PCP to follow-up on her current symptoms and incidental finding.  Ultimately, no acute intra-abdominal findings noted on her CT scan, she has a history of chronically elevated alkaline phosphatase, no evidence of biliary obstruction.  Lipase normal, white blood cell count normal.  I think is reasonable with the location of her pain and history of diverticulitis for her to continue the Augmentin for total of 10-day course.  Recommend she call her PCP today to schedule close follow-up.  Return to the emergency department for any new or worsening symptoms or any concerns.  I did prescribe her some Zofran as well as Bentyl for symptom treatment. [KA]      ED Course User Index  [KA] Naya Jordan PA-C             AS OF 11:04 EDT VITALS:    BP -  135/93  HR - 79  TEMP - 98.4 °F (36.9 °C) (Tympanic)  O2 SATS - 97%    COMPLEXITY OF CARE  Admission was considered but after careful review of the patient's presentation, physical examination, diagnostic results, and response to treatment the patient may be safely discharged with outpatient follow-up.      DIAGNOSIS  Final diagnoses:   LLQ abdominal pain   Lesion of liver   Left nephrolithiasis   Lipoma of other specified sites         DISPOSITION  ED Disposition       ED Disposition   Discharge    Condition   Good    Comment   --                  FOLLOW UP  Nina Hsieh, APRN  [address] [id number] [phone]    Schedule an appointment as soon as possible for a visit in 2 days      [facility] EMERGENCY DEPARTMENT  [address] [place] [id number] [phone]    If symptoms worsen or any concerns        Prescribed Medications     Medication List        New Prescriptions      dicyclomine 20 MG tablet  Commonly known as: BENTYL  Take 1 tablet by mouth Every 6 (Six) Hours As Needed for Abdominal Cramping.     ondansetron ODT 4 MG disintegrating tablet  Commonly known as: ZOFRAN-ODT  Take 1-2 tablets by mouth Every 8 (Eight) Hours As Needed for Nausea or Vomiting.               Where to Get Your Medications        These medications were sent to [facility] [place]  [address] [id number]      Hours: Monday to Friday 7 AM to 6 PM, Saturday & Sunday 8 AM to 4:30 PM (Closed 12 PM to 12:30 PM) Phone: [phone]   dicyclomine 20 MG tablet  ondansetron ODT 4 MG disintegrating tablet                   Please note that portions of this document were completed with a voice recognition program.    Note Disclaimer: At [facility], we believe that sharing information builds trust and better relationships. You are receiving this note because you recently visited [facility]. It is possible you will see [facility]  information before a provider has talked with you about it. This kind of information can be easy to misunderstand. To help you fully understand what it means for your health, we urge you to discuss this note with your provider.         Naya Jordan PA-C  08/29/24 1100

## 2024-08-29 NOTE — DISCHARGE INSTRUCTIONS
Follow-up with your primary care doctor, call today to schedule follow-up within a couple business days.  You should follow-up on your current symptoms as well as the incidental abnormalities on your CT scan.  Normal diet as tolerated  Continue the Augmentin for a 10-day course  Return to the ER as needed

## 2024-10-26 ENCOUNTER — HOSPITAL ENCOUNTER (EMERGENCY)
Facility: HOSPITAL | Age: 54
Discharge: HOME | End: 2024-10-26
Attending: EMERGENCY MEDICINE
Payer: MEDICAID

## 2024-10-26 ENCOUNTER — APPOINTMENT (OUTPATIENT)
Dept: CT IMAGING | Facility: HOSPITAL | Age: 54
End: 2024-10-26
Payer: MEDICAID

## 2024-10-26 VITALS
TEMPERATURE: 97.5 F | BODY MASS INDEX: 29.83 KG/M2 | HEIGHT: 61 IN | RESPIRATION RATE: 18 BRPM | HEART RATE: 96 BPM | WEIGHT: 158 LBS | DIASTOLIC BLOOD PRESSURE: 61 MMHG | SYSTOLIC BLOOD PRESSURE: 112 MMHG | OXYGEN SATURATION: 97 %

## 2024-10-26 DIAGNOSIS — R10.9 FLANK PAIN: Primary | ICD-10-CM

## 2024-10-26 LAB
ALBUMIN SERPL-MCNC: 4.1 G/DL (ref 3.5–5.2)
ALBUMIN/GLOB SERPL: 1 G/DL
ALP SERPL-CCNC: 161 U/L (ref 39–117)
ALT SERPL W P-5'-P-CCNC: 14 U/L (ref 1–33)
ANION GAP SERPL CALCULATED.3IONS-SCNC: 8.2 MMOL/L (ref 5–15)
AST SERPL-CCNC: 19 U/L (ref 1–32)
BASOPHILS # BLD AUTO: 0.02 10*3/MM3 (ref 0–0.2)
BASOPHILS NFR BLD AUTO: 0.3 % (ref 0–1.5)
BILIRUB SERPL-MCNC: 0.5 MG/DL (ref 0–1.2)
BILIRUB UR QL STRIP: NEGATIVE
BUN SERPL-MCNC: 6 MG/DL (ref 6–20)
BUN/CREAT SERPL: 7.7 (ref 7–25)
CALCIUM SPEC-SCNC: 9.6 MG/DL (ref 8.6–10.5)
CHLORIDE SERPL-SCNC: 101 MMOL/L (ref 98–107)
CLARITY UR: CLEAR
CO2 SERPL-SCNC: 28.8 MMOL/L (ref 22–29)
COLOR UR: YELLOW
CREAT SERPL-MCNC: 0.78 MG/DL (ref 0.57–1)
DEPRECATED RDW RBC AUTO: 39.1 FL (ref 37–54)
EGFRCR SERPLBLD CKD-EPI 2021: 91.5 ML/MIN/1.73
EOSINOPHIL # BLD AUTO: 0.01 10*3/MM3 (ref 0–0.4)
EOSINOPHIL NFR BLD AUTO: 0.1 % (ref 0.3–6.2)
ERYTHROCYTE [DISTWIDTH] IN BLOOD BY AUTOMATED COUNT: 13.8 % (ref 12.3–15.4)
GLOBULIN UR ELPH-MCNC: 4 GM/DL
GLUCOSE SERPL-MCNC: 109 MG/DL (ref 65–99)
GLUCOSE UR STRIP-MCNC: NEGATIVE MG/DL
HCT VFR BLD AUTO: 40.1 % (ref 34–46.6)
HGB BLD-MCNC: 12.2 G/DL (ref 12–15.9)
HGB UR QL STRIP.AUTO: NEGATIVE
IMM GRANULOCYTES # BLD AUTO: 0.02 10*3/MM3 (ref 0–0.05)
IMM GRANULOCYTES NFR BLD AUTO: 0.3 % (ref 0–0.5)
KETONES UR QL STRIP: ABNORMAL
LEUKOCYTE ESTERASE UR QL STRIP.AUTO: NEGATIVE
LYMPHOCYTES # BLD AUTO: 3.08 10*3/MM3 (ref 0.7–3.1)
LYMPHOCYTES NFR BLD AUTO: 44.7 % (ref 19.6–45.3)
MCH RBC QN AUTO: 23.6 PG (ref 26.6–33)
MCHC RBC AUTO-ENTMCNC: 30.4 G/DL (ref 31.5–35.7)
MCV RBC AUTO: 77.6 FL (ref 79–97)
MONOCYTES # BLD AUTO: 0.56 10*3/MM3 (ref 0.1–0.9)
MONOCYTES NFR BLD AUTO: 8.1 % (ref 5–12)
NEUTROPHILS NFR BLD AUTO: 3.2 10*3/MM3 (ref 1.7–7)
NEUTROPHILS NFR BLD AUTO: 46.5 % (ref 42.7–76)
NITRITE UR QL STRIP: NEGATIVE
NRBC BLD AUTO-RTO: 0 /100 WBC (ref 0–0.2)
PH UR STRIP.AUTO: <=5 [PH] (ref 5–8)
PLATELET # BLD AUTO: 264 10*3/MM3 (ref 140–450)
PMV BLD AUTO: 10.5 FL (ref 6–12)
POTASSIUM SERPL-SCNC: 3.6 MMOL/L (ref 3.5–5.2)
PROT SERPL-MCNC: 8.1 G/DL (ref 6–8.5)
PROT UR QL STRIP: NEGATIVE
RBC # BLD AUTO: 5.17 10*6/MM3 (ref 3.77–5.28)
SODIUM SERPL-SCNC: 138 MMOL/L (ref 136–145)
SP GR UR STRIP: 1.01 (ref 1–1.03)
UROBILINOGEN UR QL STRIP: ABNORMAL
WBC NRBC COR # BLD AUTO: 6.89 10*3/MM3 (ref 3.4–10.8)

## 2024-10-26 PROCEDURE — 81003 URINALYSIS AUTO W/O SCOPE: CPT | Performed by: EMERGENCY MEDICINE

## 2024-10-26 PROCEDURE — 25010000002 MORPHINE PER 10 MG: Performed by: EMERGENCY MEDICINE

## 2024-10-26 PROCEDURE — 96375 TX/PRO/DX INJ NEW DRUG ADDON: CPT

## 2024-10-26 PROCEDURE — 96374 THER/PROPH/DIAG INJ IV PUSH: CPT

## 2024-10-26 PROCEDURE — 25010000002 ONDANSETRON PER 1 MG: Performed by: EMERGENCY MEDICINE

## 2024-10-26 PROCEDURE — 85025 COMPLETE CBC W/AUTO DIFF WBC: CPT | Performed by: EMERGENCY MEDICINE

## 2024-10-26 PROCEDURE — 74176 CT ABD & PELVIS W/O CONTRAST: CPT

## 2024-10-26 PROCEDURE — 25810000003 SODIUM CHLORIDE 0.9 % SOLUTION: Performed by: EMERGENCY MEDICINE

## 2024-10-26 PROCEDURE — 99284 EMERGENCY DEPT VISIT MOD MDM: CPT

## 2024-10-26 PROCEDURE — 80053 COMPREHEN METABOLIC PANEL: CPT | Performed by: EMERGENCY MEDICINE

## 2024-10-26 RX ORDER — SODIUM CHLORIDE 0.9 % (FLUSH) 0.9 %
10 SYRINGE (ML) INJECTION AS NEEDED
Status: DISCONTINUED | OUTPATIENT
Start: 2024-10-26 | End: 2024-10-26 | Stop reason: HOSPADM

## 2024-10-26 RX ORDER — METHOCARBAMOL 500 MG/1
500 TABLET, FILM COATED ORAL 3 TIMES DAILY
Qty: 21 TABLET | Refills: 0 | Status: SHIPPED | OUTPATIENT
Start: 2024-10-26

## 2024-10-26 RX ORDER — ONDANSETRON 2 MG/ML
4 INJECTION INTRAMUSCULAR; INTRAVENOUS ONCE
Status: COMPLETED | OUTPATIENT
Start: 2024-10-26 | End: 2024-10-26

## 2024-10-26 RX ORDER — MORPHINE SULFATE 2 MG/ML
2 INJECTION, SOLUTION INTRAMUSCULAR; INTRAVENOUS ONCE
Status: COMPLETED | OUTPATIENT
Start: 2024-10-26 | End: 2024-10-26

## 2024-10-26 RX ADMIN — ONDANSETRON 4 MG: 2 INJECTION INTRAMUSCULAR; INTRAVENOUS at 02:11

## 2024-10-26 RX ADMIN — SODIUM CHLORIDE 500 ML: 9 INJECTION, SOLUTION INTRAVENOUS at 03:19

## 2024-10-26 RX ADMIN — MORPHINE SULFATE 2 MG: 2 INJECTION, SOLUTION INTRAMUSCULAR; INTRAVENOUS at 02:11

## 2024-10-26 NOTE — ED PROVIDER NOTES
EMERGENCY DEPARTMENT ENCOUNTER    Room Number:  10/10  PCP: Nina Hsieh APRN  Patient Care Team:  Nina Hsieh APRN as PCP - General (Family Medicine)   Independent Historians: Patient    HPI:  Chief Complaint: Left flank and lower abdominal pain    A complete HPI/ROS/PMH/PSH/SH/FH are unobtainable due to: None    Chronic or social conditions impacting patient care (Social Determinants of Health): None  (Financial Resource Strain / Food Insecurity / Transportation Needs / Physical Activity / Stress / Social Connections / Intimate Partner Violence / Housing Stability)    Context: Kaylie Perez is a 54 y.o. female who presents to the ED c/o acute flank and lower abdominal pain has been going on for about 2 days.  Patient states it feels like when she had a kidney stone in the past.  Patient denies any dysuria no fever chills no nausea vomiting.    Review of prior external notes (non-ED) -and- Review of prior external test results outside of this encounter: I have reviewed patient's nephrology note from 10/14/2024    Prescription drug monitoring program review:         PAST MEDICAL HISTORY  Active Ambulatory Problems     Diagnosis Date Noted    No Active Ambulatory Problems     Resolved Ambulatory Problems     Diagnosis Date Noted    No Resolved Ambulatory Problems     Past Medical History:   Diagnosis Date    Asthma     Chronic fatigue     Chronic pain     COVID-19 02/2023    BECKIE (obstructive sleep apnea)          PAST SURGICAL HISTORY  No past surgical history on file.      FAMILY HISTORY  No family history on file.      SOCIAL HISTORY  Social History     Socioeconomic History    Marital status:    Tobacco Use    Smoking status: Never    Smokeless tobacco: Never   Vaping Use    Vaping status: Never Used   Substance and Sexual Activity    Alcohol use: Yes     Comment: Socially    Drug use: Yes     Types: Marijuana    Sexual activity: Defer         ALLERGIES  Clomipramine, Doxycycline,  Flagyl [metronidazole], Tramadol, Erythromycin, and Sulfamethoxazole-trimethoprim        REVIEW OF SYSTEMS  Review of Systems  Included in HPI  All systems reviewed and negative except for those discussed in HPI.      PHYSICAL EXAM    I have reviewed the triage vital signs and nursing notes.    ED Triage Vitals [10/26/24 0153]   Temp Heart Rate Resp BP SpO2   97.5 °F (36.4 °C) (!) 121 18 136/85 96 %      Temp src Heart Rate Source Patient Position BP Location FiO2 (%)   Tympanic Monitor Sitting Right arm --       Physical Exam  GENERAL: alert, no acute distress  SKIN: Warm, dry  HENT: Normocephalic, atraumatic  EYES: no scleral icterus  CV: regular rhythm, regular rate  RESPIRATORY: normal effort, lungs clear  ABDOMEN: soft, tender palpation over left flank patient also has left lower quadrant abdominal pain there is no rebound or guarding, nondistended  MUSCULOSKELETAL: no deformity  NEURO: alert, moves all extremities, follows commands                                                               LAB RESULTS  Recent Results (from the past 24 hours)   Comprehensive Metabolic Panel    Collection Time: 10/26/24  2:07 AM    Specimen: Blood   Result Value Ref Range    Glucose 109 (H) 65 - 99 mg/dL    BUN 6 6 - 20 mg/dL    Creatinine 0.78 0.57 - 1.00 mg/dL    Sodium 138 136 - 145 mmol/L    Potassium 3.6 3.5 - 5.2 mmol/L    Chloride 101 98 - 107 mmol/L    CO2 28.8 22.0 - 29.0 mmol/L    Calcium 9.6 8.6 - 10.5 mg/dL    Total Protein 8.1 6.0 - 8.5 g/dL    Albumin 4.1 3.5 - 5.2 g/dL    ALT (SGPT) 14 1 - 33 U/L    AST (SGOT) 19 1 - 32 U/L    Alkaline Phosphatase 161 (H) 39 - 117 U/L    Total Bilirubin 0.5 0.0 - 1.2 mg/dL    Globulin 4.0 gm/dL    A/G Ratio 1.0 g/dL    BUN/Creatinine Ratio 7.7 7.0 - 25.0    Anion Gap 8.2 5.0 - 15.0 mmol/L    eGFR 91.5 >60.0 mL/min/1.73   Urinalysis With Microscopic If Indicated (No Culture) - Urine, Clean Catch    Collection Time: 10/26/24  2:07 AM    Specimen: Urine, Clean Catch   Result Value  Ref Range    Color, UA Yellow Yellow, Straw    Appearance, UA Clear Clear    pH, UA <=5.0 5.0 - 8.0    Specific Gravity, UA 1.014 1.005 - 1.030    Glucose, UA Negative Negative    Ketones, UA Trace (A) Negative    Bilirubin, UA Negative Negative    Blood, UA Negative Negative    Protein, UA Negative Negative    Leuk Esterase, UA Negative Negative    Nitrite, UA Negative Negative    Urobilinogen, UA 0.2 E.U./dL 0.2 - 1.0 E.U./dL   CBC Auto Differential    Collection Time: 10/26/24  2:07 AM    Specimen: Blood   Result Value Ref Range    WBC 6.89 3.40 - 10.80 10*3/mm3    RBC 5.17 3.77 - 5.28 10*6/mm3    Hemoglobin 12.2 12.0 - 15.9 g/dL    Hematocrit 40.1 34.0 - 46.6 %    MCV 77.6 (L) 79.0 - 97.0 fL    MCH 23.6 (L) 26.6 - 33.0 pg    MCHC 30.4 (L) 31.5 - 35.7 g/dL    RDW 13.8 12.3 - 15.4 %    RDW-SD 39.1 37.0 - 54.0 fl    MPV 10.5 6.0 - 12.0 fL    Platelets 264 140 - 450 10*3/mm3    Neutrophil % 46.5 42.7 - 76.0 %    Lymphocyte % 44.7 19.6 - 45.3 %    Monocyte % 8.1 5.0 - 12.0 %    Eosinophil % 0.1 (L) 0.3 - 6.2 %    Basophil % 0.3 0.0 - 1.5 %    Immature Grans % 0.3 0.0 - 0.5 %    Neutrophils, Absolute 3.20 1.70 - 7.00 10*3/mm3    Lymphocytes, Absolute 3.08 0.70 - 3.10 10*3/mm3    Monocytes, Absolute 0.56 0.10 - 0.90 10*3/mm3    Eosinophils, Absolute 0.01 0.00 - 0.40 10*3/mm3    Basophils, Absolute 0.02 0.00 - 0.20 10*3/mm3    Immature Grans, Absolute 0.02 0.00 - 0.05 10*3/mm3    nRBC 0.0 0.0 - 0.2 /100 WBC       I ordered the above labs and independently reviewed the results.        RADIOLOGY  CT Abdomen Pelvis Without Contrast    Result Date: 10/26/2024  CT OF THE ABDOMEN PELVIS WITHOUT CONTRAST  HISTORY: Left flank pain  COMPARISON: August 29, 2024  TECHNIQUE: Axial CT imaging was obtained through the abdomen and pelvis. No IV contrast was administered.  FINDINGS: Images through the lung bases demonstrate some scarring at the left lung base. No suspicious hepatic lesions are seen. The stomach, adrenal glands, spleen,  pancreas, and gallbladder appear normal. Patient does appear to have a lipoma within the third portion of the duodenum. No hydronephrosis is seen on either side. There is a 3 mm nonobstructing stone within the left kidney. No distal ureteral or bladder stones are seen. Patient appears to have bilateral tubal occlusion devices. There is colonic diverticulosis. There is no bowel obstruction. The appendix is normal. Sclerotic lesion is again noted within the left iliac bone.      No acute findings identified within the abdomen or pelvis.  Radiation dose reduction techniques were utilized, including automated exposure control and exposure modulation based on body size.   This report was finalized on 10/26/2024 3:08 AM by Dr. Tabitha Segundo M.D on Workstation: [id number]       I ordered the above noted radiological studies. Reviewed by me and discussed with radiologist.  See dictation for official radiology interpretation.      PROCEDURES    Procedures      MEDICATIONS GIVEN IN ER    Medications   morphine injection 2 mg (2 mg Intravenous Given 10/26/24 0211)   ondansetron (ZOFRAN) injection 4 mg (4 mg Intravenous Given 10/26/24 0211)   sodium chloride 0.9 % bolus 500 mL (0 mL Intravenous Stopped 10/26/24 0345)         ORDERS PLACED DURING THIS VISIT:  Orders Placed This Encounter   Procedures    CT Abdomen Pelvis Without Contrast    Comprehensive Metabolic Panel    Urinalysis With Microscopic If Indicated (No Culture) - Urine, Clean Catch    CBC Auto Differential    Monitor Blood Pressure    Continuous Pulse Oximetry    CBC & Differential         PROGRESS, DATA ANALYSIS, CONSULTS, AND MEDICAL DECISION MAKING    All labs have been independently interpreted by me.  All radiology studies have been reviewed by me and discussed with radiologist dictating the report.   EKG's independently viewed and interpreted by me.  Discussion below represents my analysis of pertinent findings related to patient's condition,  differential diagnosis, treatment plan and final disposition.    Differential diagnosis includes but is not limited to flank pain, kidney stone, pyelonephritis.    Clinical Scores:              ED Course as of 10/26/24 0630   Sat Oct 26, 2024   0313 WBC: 6.89 [TJ]   0313 Hemoglobin: 12.2 [TJ]   0313 Platelets: 264 [TJ]   0313 Creatinine: 0.78 [TJ]   0313 Potassium: 3.6 [TJ]   0313 Anion Gap: 8.2 [TJ]      ED Course User Index  [TJ] Grayson Roland MD               PPE: The patient wore a mask and I wore an N95 mask throughout the entire patient encounter.       AS OF 06:30 EDT VITALS:    BP - 112/61  HR - 96  TEMP - 97.5 °F (36.4 °C) (Tympanic)  O2 SATS - 97%        DIAGNOSIS  Final diagnoses:   Flank pain         DISPOSITION  ED Disposition       ED Disposition   Discharge    Condition   Stable    Comment   --                  Note Disclaimer: At [facility], we believe that sharing information builds trust and better relationships. You are receiving this note because you recently visited [facility]. It is possible you will see health information before a provider has talked with you about it. This kind of information can be easy to misunderstand. To help you fully understand what it means for your health, we urge you to discuss this note with your provider.         Grayson Roland MD  10/26/24 0630

## 2024-10-26 NOTE — ED NOTES
Pt requested to speak to charge nurse. Charge nurse elsy notified and went into pt room to speak to her.

## 2024-10-29 ENCOUNTER — TELEPHONE (OUTPATIENT)
Dept: GASTROENTEROLOGY | Facility: CLINIC | Age: 54
End: 2024-10-29
Payer: MEDICAID

## 2024-10-29 NOTE — TELEPHONE ENCOUNTER
"Pt left a VM stating that she has an appt w/CC in December, as a New Pt.     She has been having abdominal pain, and has been to the ER twice. Last time this was found on CT:     10/26/24 CT OF THE ABDOMEN PELVIS WITHOUT CONTRAST   left flank and lower abd pain     Patient does appear to have a lipoma within the third portion of the duodenum    Pt wanted to know if CC can prescribe \"something\" to relieve symptoms while she waits for her appointment.   "

## 2024-10-30 ENCOUNTER — TELEPHONE (OUTPATIENT)
Dept: GASTROENTEROLOGY | Facility: CLINIC | Age: 54
End: 2024-10-30
Payer: MEDICAID

## 2024-11-04 ENCOUNTER — APPOINTMENT (OUTPATIENT)
Dept: CT IMAGING | Facility: HOSPITAL | Age: 54
End: 2024-11-04
Payer: MEDICAID

## 2024-11-04 ENCOUNTER — HOSPITAL ENCOUNTER (EMERGENCY)
Facility: HOSPITAL | Age: 54
Discharge: HOME | End: 2024-11-04
Attending: EMERGENCY MEDICINE | Admitting: EMERGENCY MEDICINE
Payer: MEDICAID

## 2024-11-04 VITALS
DIASTOLIC BLOOD PRESSURE: 72 MMHG | OXYGEN SATURATION: 99 % | HEART RATE: 77 BPM | WEIGHT: 158 LBS | BODY MASS INDEX: 29.83 KG/M2 | RESPIRATION RATE: 14 BRPM | TEMPERATURE: 97.1 F | SYSTOLIC BLOOD PRESSURE: 110 MMHG | HEIGHT: 61 IN

## 2024-11-04 DIAGNOSIS — K29.00 ACUTE GASTRITIS WITHOUT HEMORRHAGE, UNSPECIFIED GASTRITIS TYPE: ICD-10-CM

## 2024-11-04 DIAGNOSIS — R10.84 GENERALIZED ABDOMINAL PAIN: Primary | ICD-10-CM

## 2024-11-04 LAB
ALBUMIN SERPL-MCNC: 4.2 G/DL (ref 3.5–5.2)
ALBUMIN/GLOB SERPL: 1.4 G/DL
ALP SERPL-CCNC: 144 U/L (ref 39–117)
ALT SERPL W P-5'-P-CCNC: 14 U/L (ref 1–33)
ANION GAP SERPL CALCULATED.3IONS-SCNC: 13 MMOL/L (ref 5–15)
AST SERPL-CCNC: 17 U/L (ref 1–32)
BASOPHILS # BLD AUTO: 0.02 10*3/MM3 (ref 0–0.2)
BASOPHILS NFR BLD AUTO: 0.3 % (ref 0–1.5)
BILIRUB SERPL-MCNC: 0.3 MG/DL (ref 0–1.2)
BUN SERPL-MCNC: 5 MG/DL (ref 6–20)
BUN/CREAT SERPL: 6.5 (ref 7–25)
CALCIUM SPEC-SCNC: 9.6 MG/DL (ref 8.6–10.5)
CHLORIDE SERPL-SCNC: 98 MMOL/L (ref 98–107)
CO2 SERPL-SCNC: 28 MMOL/L (ref 22–29)
CREAT SERPL-MCNC: 0.77 MG/DL (ref 0.57–1)
DEPRECATED RDW RBC AUTO: 35.5 FL (ref 37–54)
EGFRCR SERPLBLD CKD-EPI 2021: 92.9 ML/MIN/1.73
EOSINOPHIL # BLD AUTO: 0.09 10*3/MM3 (ref 0–0.4)
EOSINOPHIL NFR BLD AUTO: 1.4 % (ref 0.3–6.2)
ERYTHROCYTE [DISTWIDTH] IN BLOOD BY AUTOMATED COUNT: 13.3 % (ref 12.3–15.4)
GLOBULIN UR ELPH-MCNC: 3.1 GM/DL
GLUCOSE SERPL-MCNC: 80 MG/DL (ref 65–99)
HCT VFR BLD AUTO: 37.3 % (ref 34–46.6)
HGB BLD-MCNC: 11.6 G/DL (ref 12–15.9)
IMM GRANULOCYTES # BLD AUTO: 0.02 10*3/MM3 (ref 0–0.05)
IMM GRANULOCYTES NFR BLD AUTO: 0.3 % (ref 0–0.5)
LYMPHOCYTES # BLD AUTO: 2.66 10*3/MM3 (ref 0.7–3.1)
LYMPHOCYTES NFR BLD AUTO: 41.8 % (ref 19.6–45.3)
MCH RBC QN AUTO: 23.6 PG (ref 26.6–33)
MCHC RBC AUTO-ENTMCNC: 31.1 G/DL (ref 31.5–35.7)
MCV RBC AUTO: 75.8 FL (ref 79–97)
MONOCYTES # BLD AUTO: 0.49 10*3/MM3 (ref 0.1–0.9)
MONOCYTES NFR BLD AUTO: 7.7 % (ref 5–12)
NEUTROPHILS NFR BLD AUTO: 3.09 10*3/MM3 (ref 1.7–7)
NEUTROPHILS NFR BLD AUTO: 48.5 % (ref 42.7–76)
NRBC BLD AUTO-RTO: 0 /100 WBC (ref 0–0.2)
PLATELET # BLD AUTO: 219 10*3/MM3 (ref 140–450)
PMV BLD AUTO: 10.5 FL (ref 6–12)
POTASSIUM SERPL-SCNC: 3.8 MMOL/L (ref 3.5–5.2)
PROT SERPL-MCNC: 7.3 G/DL (ref 6–8.5)
RBC # BLD AUTO: 4.92 10*6/MM3 (ref 3.77–5.28)
SODIUM SERPL-SCNC: 139 MMOL/L (ref 136–145)
WBC NRBC COR # BLD AUTO: 6.37 10*3/MM3 (ref 3.4–10.8)

## 2024-11-04 PROCEDURE — 25510000001 IOPAMIDOL 61 % SOLUTION: Performed by: EMERGENCY MEDICINE

## 2024-11-04 PROCEDURE — 74177 CT ABD & PELVIS W/CONTRAST: CPT

## 2024-11-04 PROCEDURE — 99285 EMERGENCY DEPT VISIT HI MDM: CPT

## 2024-11-04 PROCEDURE — 80053 COMPREHEN METABOLIC PANEL: CPT | Performed by: PHYSICIAN ASSISTANT

## 2024-11-04 PROCEDURE — 85025 COMPLETE CBC W/AUTO DIFF WBC: CPT | Performed by: PHYSICIAN ASSISTANT

## 2024-11-04 RX ORDER — PANTOPRAZOLE SODIUM 40 MG/1
40 TABLET, DELAYED RELEASE ORAL DAILY
Qty: 30 TABLET | Refills: 0 | Status: SHIPPED | OUTPATIENT
Start: 2024-11-04

## 2024-11-04 RX ORDER — SODIUM CHLORIDE 0.9 % (FLUSH) 0.9 %
10 SYRINGE (ML) INJECTION AS NEEDED
Status: DISCONTINUED | OUTPATIENT
Start: 2024-11-04 | End: 2024-11-04 | Stop reason: HOSPADM

## 2024-11-04 RX ORDER — IOPAMIDOL 612 MG/ML
100 INJECTION, SOLUTION INTRAVASCULAR
Status: COMPLETED | OUTPATIENT
Start: 2024-11-04 | End: 2024-11-04

## 2024-11-04 RX ADMIN — IOPAMIDOL 85 ML: 612 INJECTION, SOLUTION INTRAVENOUS at 19:13

## 2024-11-04 NOTE — ED NOTES
Patient states she was eating Friday when she realized part of a plastic fork had broken off and she had swallowed it. Patient states Saturday she began to have abdominal pain and nausea. Patient denies any vomiting or blood in her stool. Patient states she has had a decreased appetite and her bm's are now yellow.

## 2024-11-04 NOTE — ED PROVIDER NOTES
EMERGENCY DEPARTMENT ENCOUNTER      PCP: Nina Hsieh APRN  Patient Care Team:  Nina Hsieh APRN as PCP - General (Family Medicine)   Independent Historians: Patient    HPI:  Chief Complaint: Swallowed foreign body   A complete HPI/ROS/PMH/PSH/SH/FH are unobtainable due to: None    Chronic or social conditions impacting patient care (social determinants of health): None    Context: Kaylie Perez is a 54 y.o. female who presents to the ED c/o acute lower abdominal pain. Pt states she was eating 3 days ago and noticed a piece of her plastic fork had broken off and she believes she swallowed it. She denies any pain at any point with swallowing. She has not had any vomiting. Pt states the next day she began having some abdominal pain mostly in her LLQ with associated nausea. She denies fevers, urinary symptoms, melena.     Review of prior external notes and/or external test results outside of this encounter: 10/26/24 CMP was unremarkable except a chronically elevated Alkaline phosphatase. CBC from same date showed normal hemoglobin.       PAST MEDICAL HISTORY  Active Ambulatory Problems     Diagnosis Date Noted    No Active Ambulatory Problems     Resolved Ambulatory Problems     Diagnosis Date Noted    No Resolved Ambulatory Problems     Past Medical History:   Diagnosis Date    Asthma     Chronic fatigue     Chronic pain     COVID-19 02/2023    BECKIE (obstructive sleep apnea)        The patient has started, but not completed, their COVID-19 vaccination series.    PAST SURGICAL HISTORY  No past surgical history on file.      FAMILY HISTORY  No family history on file.      SOCIAL HISTORY  Social History     Socioeconomic History    Marital status:    Tobacco Use    Smoking status: Never    Smokeless tobacco: Never   Vaping Use    Vaping status: Never Used   Substance and Sexual Activity    Alcohol use: Yes     Comment: Socially    Drug use: Yes     Types: Marijuana    Sexual activity: Defer          ALLERGIES  Clomipramine, Doxycycline, Flagyl [metronidazole], Tramadol, Erythromycin, and Sulfamethoxazole-trimethoprim        REVIEW OF SYSTEMS  Review of Systems   Constitutional:  Negative for fever.   Gastrointestinal:  Positive for abdominal pain and nausea.        All systems reviewed and negative except for those discussed in HPI.       PHYSICAL EXAM    I have reviewed the triage vital signs and nursing notes.    ED Triage Vitals   Temp Heart Rate Resp BP SpO2   11/04/24 1757 11/04/24 1757 11/04/24 1757 11/04/24 1759 11/04/24 1757   97.1 °F (36.2 °C) 104 15 127/81 100 %      Temp src Heart Rate Source Patient Position BP Location FiO2 (%)   -- -- -- -- --              Physical Exam  GENERAL: alert, no acute distress  SKIN: Warm, dry  HENT: Normocephalic, atraumatic  EYES: no scleral icterus  CV: regular rhythm, regular rate  RESPIRATORY: normal effort, lungs clear  ABDOMEN: soft, mild left sided abdominal discomfort on palpation, no guarding or rigidity, nondistended  MUSCULOSKELETAL: no deformity  NEURO: alert, moves all extremities, follows commands          LAB RESULTS  Labs Reviewed   COMPREHENSIVE METABOLIC PANEL - Abnormal; Notable for the following components:       Result Value    BUN 5 (*)     Alkaline Phosphatase 144 (*)     BUN/Creatinine Ratio 6.5 (*)     All other components within normal limits    Narrative:     GFR Normal >60  Chronic Kidney Disease <60  Kidney Failure <15     CBC WITH AUTO DIFFERENTIAL - Abnormal; Notable for the following components:    Hemoglobin 11.6 (*)     MCV 75.8 (*)     MCH 23.6 (*)     MCHC 31.1 (*)     RDW-SD 35.5 (*)     All other components within normal limits   CBC AND DIFFERENTIAL    Narrative:     The following orders were created for panel order CBC & Differential.  Procedure                               Abnormality         Status                     ---------                               -----------         ------                     CBC Auto  Differential[[id number]]        Abnormal            Final result                 Please view results for these tests on the individual orders.       Ordered the above labs and independently reviewed and interpreted the results.        RADIOLOGY  CT Abdomen Pelvis With Contrast   Final Result       1. Gastric wall thickening could be accentuated by underdistention but   could reflect a nonspecific gastritis..   2. Left nephrolithiasis.   3. Colonic diverticula without CT evidence of diverticulitis.       This report was finalized on 11/4/2024 8:03 PM by Puneet Carcamo MD on   Workstation: [id number]                I ordered the above noted radiological studies. Independently reviewed and interpreted by me.  See dictation for official radiology interpretation.      PROCEDURES    Procedures      MEDICATIONS GIVEN IN ER    Medications   iopamidol (ISOVUE-300) 61 % injection 100 mL (85 mL Intravenous Given 11/4/24 1913)         PROGRESS, DATA ANALYSIS, CONSULTS, AND MEDICAL DECISION MAKING    All labs have been independently reviewed and interpreted by me.  All radiology studies have been independently reviewed and interpreted by me and discussed with radiologist dictating the report.   EKG's independently reviewed and interpreted by me.  Discussion below represents my analysis of pertinent findings related to patient's condition, differential diagnosis, treatment plan and final disposition.    My differential diagnosis for abdominal pain includes but is not limited to:    Gastritis, gastroenteritis, peptic ulcer disease, GERD, esophageal perforation, acute appendicitis, mesenteric adenitis, Meckel’s diverticulum, epiploic appendagitis, diverticulitis, colon cancer, ulcerative colitis, Crohn’s disease, intussusception, small bowel obstruction, adhesions, ischemic bowel, perforated viscus, ileus, obstipation, biliary colic, cholecystitis, cholelithiasis, Lencho-[place] Kiet, hepatitis, pancreatitis, common bile duct  obstruction, cholangitis, bile leak, splenic trauma, splenic rupture, splenic infarction, splenic abscess, abdominal abscess, ascites, spontaneous bacterial peritonitis, hernia, UTI, cystitis, ureterolithiasis, urinary obstruction, ovarian cyst, torsion, pregnancy, ectopic pregnancy, PID, pelvic abscess, mittelschmerz, endometriosis, AAA, myocardial infarction, pneumonia, cancer, porphyria, DKA, medications, sickle cell, viral syndrome, herpes zoster      ED Course as of 11/09/24 2223 Mon Nov 04, 2024 1918 CT Abdomen Pelvis With Contrast  My independent interpretation of the imaging study is no visualized gas or foreign body [TR]   1934 WBC: 6.37 [DC]   1934 Hemoglobin(!): 11.6 [DC]      ED Course User Index  [DC] Doreen Tsang PA  [TR] Bobby Valles MD             AS OF 22:23 EST VITALS:    BP - 110/72  HR - 77  TEMP - 97.1 °F (36.2 °C)  O2 SATS - 99%        DIAGNOSIS  Final diagnoses:   Generalized abdominal pain   Acute gastritis without hemorrhage, unspecified gastritis type         DISPOSITION  ED Disposition       ED Disposition   Discharge    Condition   Stable    Comment   --                  Note Disclaimer: At [facility], we believe that sharing information builds trust and better relationships. You are receiving this note because you recently visited [facility]. It is possible you will see health information before a provider has talked with you about it. This kind of information can be easy to misunderstand. To help you fully understand what it means for your health, we urge you to discuss this note with your provider.         Doreen Tsang PA  11/09/24 2228

## 2024-11-05 NOTE — ED PROVIDER NOTES
EMERGENCY DEPARTMENT MD ATTESTATION NOTE    Room Number:  44/44  PCP: Nina Hsieh APRN  Independent Historians: Patient    HPI:  A complete HPI/ROS/PMH/PSH/SH/FH are unobtainable due to: None    Chronic or social conditions impacting patient care (Social Determinants of Health): None      Context: Kaylie Perez is a 54 y.o. female with a medical history of diverticulitis who presents to the ED c/o acute abdominal pain.  The patient reports she was eating on Friday and 1 brittany of a plastic fork broke off and she swallowed it.  She states she thought it would pass.  She reports she has a history of diverticulitis.  She reports on Saturday she started having some lower abdominal pain bilaterally.  She states her stool turned yellow.  She denies any upper abdominal pain.  She denies any fevers.  She denies vomiting.        Review of prior external notes (non-ED) -and- Review of prior external test results outside of this encounter:  Laboratory evaluation 10/26/2024 shows normal CMP, normal CBC    Prescription drug monitoring program review:           PHYSICAL EXAM    I have reviewed the triage vital signs and nursing notes.    ED Triage Vitals   Temp Heart Rate Resp BP SpO2   11/04/24 1757 11/04/24 1757 11/04/24 1757 11/04/24 1759 11/04/24 1757   97.1 °F (36.2 °C) 104 15 127/81 100 %      Temp src Heart Rate Source Patient Position BP Location FiO2 (%)   -- -- -- -- --              Physical Exam  GENERAL: Awake, alert, no acute distress, not acutely ill-appearing  SKIN: Warm, dry  HENT: Normocephalic, atraumatic  EYES: no scleral icterus  CV: regular rhythm, regular rate  RESPIRATORY: normal effort, lungs clear  ABDOMEN: soft, nontender, nondistended, nonsurgical abdomen  MUSCULOSKELETAL: no deformity  NEURO: alert, moves all extremities, follows commands            MEDICATIONS GIVEN IN ER  Medications   sodium chloride 0.9 % flush 10 mL (has no administration in time range)   iopamidol (ISOVUE-300) 61 %  injection 100 mL (85 mL Intravenous Given 11/4/24 1913)         ORDERS PLACED DURING THIS VISIT:  Orders Placed This Encounter   Procedures    CT Abdomen Pelvis With Contrast    Comprehensive Metabolic Panel    CBC Auto Differential    Insert Peripheral IV    CBC & Differential         PROCEDURES  Procedures            PROGRESS, DATA ANALYSIS, CONSULTS, AND MEDICAL DECISION MAKING  All labs have been independently interpreted by me.  All radiology studies have been reviewed by me. All EKG's have been independently viewed and interpreted by me.  Discussion below represents my analysis of pertinent findings related to patient's condition, differential diagnosis, treatment plan and final disposition.    Differential diagnosis includes but is not limited to bowel obstruction, bowel perforation, diverticulitis.    Clinical Scores:                   ED Course as of 11/04/24 2007 Mon Nov 04, 2024 1918 CT Abdomen Pelvis With Contrast  My independent interpretation of the imaging study is no visualized gas or foreign body [TR]   1934 WBC: 6.37 [DC]   1934 Hemoglobin(!): 11.6 [DC]      ED Course User Index  [DC] Doreen Tsang PA  [TR] Bobby Valles MD       MDM: The patient appears well.  Plan CT imaging giving the patient's lower abdominal pain and history of diverticulitis.  We will also rule out perforation and free air as well as obstruction.  We will obtain laboratory evaluation given the patient's yellow stool to rule out liver disease and gallbladder disease.      COMPLEXITY OF CARE  Admission was considered but after careful review of the patient's presentation, physical examination, diagnostic results, and response to treatment the patient may be safely discharged with outpatient follow-up.    Please note that portions of this document were completed with a voice recognition program.    Note Disclaimer: At [facility], we believe that sharing information builds trust and better relationships. You are  receiving this note because you recently visited [facility]. It is possible you will see health information before a provider has talked with you about it. This kind of information can be easy to misunderstand. To help you fully understand what it means for your health, we urge you to discuss this note with your provider.         Bobby Valles MD  11/04/24 2009

## 2024-11-26 ENCOUNTER — APPOINTMENT (OUTPATIENT)
Dept: GENERAL RADIOLOGY | Facility: HOSPITAL | Age: 54
End: 2024-11-26
Payer: MEDICAID

## 2024-11-26 ENCOUNTER — HOSPITAL ENCOUNTER (OUTPATIENT)
Facility: HOSPITAL | Age: 54
Setting detail: OBSERVATION
Discharge: HOME | End: 2024-11-29
Attending: EMERGENCY MEDICINE | Admitting: INTERNAL MEDICINE
Payer: MEDICAID

## 2024-11-26 DIAGNOSIS — R06.02 SHORTNESS OF BREATH: Primary | ICD-10-CM

## 2024-11-26 DIAGNOSIS — U09.9 COVID-19 LONG HAULER: ICD-10-CM

## 2024-11-26 DIAGNOSIS — R07.9 CHEST PAIN, UNSPECIFIED TYPE: ICD-10-CM

## 2024-11-26 DIAGNOSIS — R10.9 ABDOMINAL PAIN: ICD-10-CM

## 2024-11-26 PROBLEM — R06.00 DYSPNEA: Status: ACTIVE | Noted: 2024-11-26

## 2024-11-26 LAB
ALBUMIN SERPL-MCNC: 4.1 G/DL (ref 3.5–5.2)
ALBUMIN/GLOB SERPL: 1.1 G/DL
ALP SERPL-CCNC: 130 U/L (ref 39–117)
ALT SERPL W P-5'-P-CCNC: 13 U/L (ref 1–33)
ANION GAP SERPL CALCULATED.3IONS-SCNC: 18.4 MMOL/L (ref 5–15)
AST SERPL-CCNC: 21 U/L (ref 1–32)
BASOPHILS # BLD AUTO: 0.01 10*3/MM3 (ref 0–0.2)
BASOPHILS NFR BLD AUTO: 0.2 % (ref 0–1.5)
BILIRUB SERPL-MCNC: 0.4 MG/DL (ref 0–1.2)
BUN SERPL-MCNC: 7 MG/DL (ref 6–20)
BUN/CREAT SERPL: 7.3 (ref 7–25)
CALCIUM SPEC-SCNC: 10.3 MG/DL (ref 8.6–10.5)
CHLORIDE SERPL-SCNC: 95 MMOL/L (ref 98–107)
CO2 SERPL-SCNC: 24.6 MMOL/L (ref 22–29)
CREAT SERPL-MCNC: 0.96 MG/DL (ref 0.57–1)
D DIMER PPP FEU-MCNC: <0.27 MCGFEU/ML (ref 0–0.52)
DEPRECATED RDW RBC AUTO: 35.9 FL (ref 37–54)
EGFRCR SERPLBLD CKD-EPI 2021: 71.3 ML/MIN/1.73
EOSINOPHIL # BLD AUTO: 0 10*3/MM3 (ref 0–0.4)
EOSINOPHIL NFR BLD AUTO: 0 % (ref 0.3–6.2)
ERYTHROCYTE [DISTWIDTH] IN BLOOD BY AUTOMATED COUNT: 13.5 % (ref 12.3–15.4)
GEN 5 2HR TROPONIN T REFLEX: 21 NG/L
GLOBULIN UR ELPH-MCNC: 3.9 GM/DL
GLUCOSE SERPL-MCNC: 100 MG/DL (ref 65–99)
HCT VFR BLD AUTO: 40.9 % (ref 34–46.6)
HGB BLD-MCNC: 12.8 G/DL (ref 12–15.9)
HOLD SPECIMEN: NORMAL
HOLD SPECIMEN: NORMAL
IMM GRANULOCYTES # BLD AUTO: 0.02 10*3/MM3 (ref 0–0.05)
IMM GRANULOCYTES NFR BLD AUTO: 0.3 % (ref 0–0.5)
LYMPHOCYTES # BLD AUTO: 1.64 10*3/MM3 (ref 0.7–3.1)
LYMPHOCYTES NFR BLD AUTO: 28.6 % (ref 19.6–45.3)
MCH RBC QN AUTO: 23.5 PG (ref 26.6–33)
MCHC RBC AUTO-ENTMCNC: 31.3 G/DL (ref 31.5–35.7)
MCV RBC AUTO: 75 FL (ref 79–97)
MONOCYTES # BLD AUTO: 0.47 10*3/MM3 (ref 0.1–0.9)
MONOCYTES NFR BLD AUTO: 8.2 % (ref 5–12)
NEUTROPHILS NFR BLD AUTO: 3.59 10*3/MM3 (ref 1.7–7)
NEUTROPHILS NFR BLD AUTO: 62.7 % (ref 42.7–76)
NRBC BLD AUTO-RTO: 0 /100 WBC (ref 0–0.2)
PLATELET # BLD AUTO: 233 10*3/MM3 (ref 140–450)
PMV BLD AUTO: 10.6 FL (ref 6–12)
POTASSIUM SERPL-SCNC: 4.2 MMOL/L (ref 3.5–5.2)
PROT SERPL-MCNC: 8 G/DL (ref 6–8.5)
RBC # BLD AUTO: 5.45 10*6/MM3 (ref 3.77–5.28)
SODIUM SERPL-SCNC: 138 MMOL/L (ref 136–145)
TROPONIN T DELTA: 15 NG/L
TROPONIN T SERPL HS-MCNC: 16 NG/L
TROPONIN T SERPL HS-MCNC: 6 NG/L
WBC NRBC COR # BLD AUTO: 5.73 10*3/MM3 (ref 3.4–10.8)
WHOLE BLOOD HOLD COAG: NORMAL
WHOLE BLOOD HOLD SPECIMEN: NORMAL

## 2024-11-26 PROCEDURE — 99285 EMERGENCY DEPT VISIT HI MDM: CPT

## 2024-11-26 PROCEDURE — 94664 DEMO&/EVAL PT USE INHALER: CPT

## 2024-11-26 PROCEDURE — G0378 HOSPITAL OBSERVATION PER HR: HCPCS

## 2024-11-26 PROCEDURE — 94640 AIRWAY INHALATION TREATMENT: CPT

## 2024-11-26 PROCEDURE — 84484 ASSAY OF TROPONIN QUANT: CPT

## 2024-11-26 PROCEDURE — 93010 ELECTROCARDIOGRAM REPORT: CPT | Performed by: INTERNAL MEDICINE

## 2024-11-26 PROCEDURE — 80053 COMPREHEN METABOLIC PANEL: CPT

## 2024-11-26 PROCEDURE — 85379 FIBRIN DEGRADATION QUANT: CPT | Performed by: EMERGENCY MEDICINE

## 2024-11-26 PROCEDURE — 94799 UNLISTED PULMONARY SVC/PX: CPT

## 2024-11-26 PROCEDURE — 25010000002 ONDANSETRON PER 1 MG: Performed by: NURSE PRACTITIONER

## 2024-11-26 PROCEDURE — 96374 THER/PROPH/DIAG INJ IV PUSH: CPT

## 2024-11-26 PROCEDURE — 94761 N-INVAS EAR/PLS OXIMETRY MLT: CPT

## 2024-11-26 PROCEDURE — 36415 COLL VENOUS BLD VENIPUNCTURE: CPT

## 2024-11-26 PROCEDURE — 84484 ASSAY OF TROPONIN QUANT: CPT | Performed by: EMERGENCY MEDICINE

## 2024-11-26 PROCEDURE — 25010000002 KETOROLAC TROMETHAMINE PER 15 MG

## 2024-11-26 PROCEDURE — 93005 ELECTROCARDIOGRAM TRACING: CPT | Performed by: EMERGENCY MEDICINE

## 2024-11-26 PROCEDURE — 85025 COMPLETE CBC W/AUTO DIFF WBC: CPT

## 2024-11-26 PROCEDURE — 84484 ASSAY OF TROPONIN QUANT: CPT | Performed by: NURSE PRACTITIONER

## 2024-11-26 PROCEDURE — 71045 X-RAY EXAM CHEST 1 VIEW: CPT

## 2024-11-26 PROCEDURE — 96375 TX/PRO/DX INJ NEW DRUG ADDON: CPT

## 2024-11-26 RX ORDER — ACETAMINOPHEN 325 MG/1
650 TABLET ORAL EVERY 6 HOURS PRN
Status: DISCONTINUED | OUTPATIENT
Start: 2024-11-26 | End: 2024-11-29 | Stop reason: HOSPADM

## 2024-11-26 RX ORDER — NITROGLYCERIN 0.4 MG/1
0.4 TABLET SUBLINGUAL
Status: DISCONTINUED | OUTPATIENT
Start: 2024-11-26 | End: 2024-11-29 | Stop reason: HOSPADM

## 2024-11-26 RX ORDER — SODIUM CHLORIDE 0.9 % (FLUSH) 0.9 %
10 SYRINGE (ML) INJECTION EVERY 12 HOURS SCHEDULED
Status: DISCONTINUED | OUTPATIENT
Start: 2024-11-26 | End: 2024-11-29 | Stop reason: HOSPADM

## 2024-11-26 RX ORDER — ALBUTEROL SULFATE 0.83 MG/ML
2.5 SOLUTION RESPIRATORY (INHALATION) EVERY 6 HOURS PRN
Status: DISCONTINUED | OUTPATIENT
Start: 2024-11-26 | End: 2024-11-29 | Stop reason: HOSPADM

## 2024-11-26 RX ORDER — KETOROLAC TROMETHAMINE 15 MG/ML
15 INJECTION, SOLUTION INTRAMUSCULAR; INTRAVENOUS ONCE
Status: COMPLETED | OUTPATIENT
Start: 2024-11-27 | End: 2024-11-26

## 2024-11-26 RX ORDER — SODIUM CHLORIDE 0.9 % (FLUSH) 0.9 %
10 SYRINGE (ML) INJECTION AS NEEDED
Status: DISCONTINUED | OUTPATIENT
Start: 2024-11-26 | End: 2024-11-29 | Stop reason: HOSPADM

## 2024-11-26 RX ORDER — ASPIRIN 325 MG
325 TABLET ORAL ONCE
Status: DISCONTINUED | OUTPATIENT
Start: 2024-11-26 | End: 2024-11-29 | Stop reason: HOSPADM

## 2024-11-26 RX ORDER — ONDANSETRON 2 MG/ML
4 INJECTION INTRAMUSCULAR; INTRAVENOUS EVERY 6 HOURS PRN
Status: DISCONTINUED | OUTPATIENT
Start: 2024-11-26 | End: 2024-11-29 | Stop reason: HOSPADM

## 2024-11-26 RX ORDER — PANTOPRAZOLE SODIUM 40 MG/1
40 TABLET, DELAYED RELEASE ORAL DAILY
Status: DISCONTINUED | OUTPATIENT
Start: 2024-11-26 | End: 2024-11-29 | Stop reason: HOSPADM

## 2024-11-26 RX ORDER — IPRATROPIUM BROMIDE AND ALBUTEROL SULFATE 2.5; .5 MG/3ML; MG/3ML
3 SOLUTION RESPIRATORY (INHALATION) ONCE
Status: COMPLETED | OUTPATIENT
Start: 2024-11-26 | End: 2024-11-26

## 2024-11-26 RX ORDER — DICYCLOMINE HYDROCHLORIDE 10 MG/1
10 CAPSULE ORAL 4 TIMES DAILY
Status: DISCONTINUED | OUTPATIENT
Start: 2024-11-26 | End: 2024-11-29 | Stop reason: HOSPADM

## 2024-11-26 RX ORDER — PREDNISONE 20 MG/1
10 TABLET ORAL
Status: DISCONTINUED | OUTPATIENT
Start: 2024-11-26 | End: 2024-11-26

## 2024-11-26 RX ORDER — SODIUM CHLORIDE 9 MG/ML
40 INJECTION, SOLUTION INTRAVENOUS AS NEEDED
Status: DISCONTINUED | OUTPATIENT
Start: 2024-11-26 | End: 2024-11-29 | Stop reason: HOSPADM

## 2024-11-26 RX ORDER — PREDNISONE 10 MG/1
10 TABLET ORAL
COMMUNITY
Start: 2024-11-22 | End: 2024-12-20

## 2024-11-26 RX ADMIN — Medication 10 ML: at 20:52

## 2024-11-26 RX ADMIN — ONDANSETRON 4 MG: 2 INJECTION, SOLUTION INTRAMUSCULAR; INTRAVENOUS at 17:17

## 2024-11-26 RX ADMIN — IPRATROPIUM BROMIDE AND ALBUTEROL SULFATE 3 ML: 2.5; .5 SOLUTION RESPIRATORY (INHALATION) at 12:59

## 2024-11-26 RX ADMIN — PANTOPRAZOLE SODIUM 40 MG: 40 TABLET, DELAYED RELEASE ORAL at 20:51

## 2024-11-26 RX ADMIN — Medication 10 ML: at 17:17

## 2024-11-26 RX ADMIN — DICYCLOMINE HYDROCHLORIDE 10 MG: 10 CAPSULE ORAL at 22:16

## 2024-11-26 RX ADMIN — KETOROLAC TROMETHAMINE 15 MG: 15 INJECTION, SOLUTION INTRAMUSCULAR; INTRAVENOUS at 23:46

## 2024-11-26 NOTE — ED PROVIDER NOTES
" EMERGENCY DEPARTMENT ENCOUNTER  Room Number:  35/35  PCP: Nina Hsieh APRN  Independent Historians: Patient      HPI:  Chief Complaint: had concerns including Shortness of Breath and Palpitations.  Multiple complaints    A complete HPI/ROS/PMH/PSH/SH/FH are unobtainable due to:   Chronic or social conditions impacting patient care (Social Determinants of Health):       Context: Kaylie Perez is a 54 y.o. female with a medical history of asthma who presents to the ED c/o acute feeling poorly since July.  Patient states she has a history of \"long hauling\".  She had COVID in 2020, 2023 and got a bed case in July 2024.  She reports digestive problems and pulmonary problems.  She has had roughly 23 pound weight loss.  She reports shortness of breath and occasional tachycardia.  She has been going downhill.  She was seen by pulmonology recently was prescribed prednisone but could not take it.  Patient denies recent fever.  Denies productive cough.  Denies GI bleeding.      Review of prior external notes (non-ED) -and- Review of prior external test results outside of this encounter:   I reviewed prior medical records including recent visit with pulmonology.  Patient apparently does have sleep apnea but has been not using CPAP machine.      Prescription drug monitoring program review:         PAST MEDICAL HISTORY  Active Ambulatory Problems     Diagnosis Date Noted    No Active Ambulatory Problems     Resolved Ambulatory Problems     Diagnosis Date Noted    No Resolved Ambulatory Problems     Past Medical History:   Diagnosis Date    Asthma     Chronic fatigue     Chronic pain     COVID-19 02/2023    BECKIE (obstructive sleep apnea)          PAST SURGICAL HISTORY  History reviewed. No pertinent surgical history.      FAMILY HISTORY  History reviewed. No pertinent family history.      SOCIAL HISTORY  Social History     Socioeconomic History    Marital status:    Tobacco Use    Smoking status: Never    " Smokeless tobacco: Never   Vaping Use    Vaping status: Never Used   Substance and Sexual Activity    Alcohol use: Yes     Comment: Socially    Drug use: Yes     Types: Marijuana    Sexual activity: Defer         ALLERGIES  Clomipramine, Doxycycline, Flagyl [metronidazole], Tramadol, Erythromycin, and Sulfamethoxazole-trimethoprim      REVIEW OF SYSTEMS  Review of Systems   Constitutional:  Positive for appetite change, fatigue and unexpected weight change. Negative for fever.   HENT:  Positive for sore throat.    Respiratory:  Positive for shortness of breath.    Cardiovascular:  Positive for palpitations. Negative for chest pain.   Gastrointestinal:  Positive for nausea.   All other systems reviewed and are negative.    Included in HPI  All systems reviewed and negative except for those discussed in HPI.      PHYSICAL EXAM    I have reviewed the triage vital signs and nursing notes.    ED Triage Vitals   Temp Heart Rate Resp BP SpO2   11/26/24 1035 11/26/24 1035 11/26/24 1035 11/26/24 1130 11/26/24 1035   99 °F (37.2 °C) 106 18 120/75 100 %      Temp src Heart Rate Source Patient Position BP Location FiO2 (%)   -- -- -- -- --              Physical Exam  GENERAL: Alert well-appearing female in no obvious distress.  Triage vitals reviewed and notable for blood pressure 120/75.  O2 sats 100% on room air.  Pulse 106.-No murmur, heart rate around 100  SKIN: Warm, dry  HENT: Normocephalic, atraumatic  EYES: no scleral icterus  CV: regular rhythm, regular rate  RESPIRATORY: normal effort, lungs clear-O2 sats 100% room air  ABDOMEN: soft, nontender, nondistended  MUSCULOSKELETAL: no deformity-no significant swelling or tenderness to palpation  NEURO: alert, moves all extremities, follows commands      LAB RESULTS  Recent Results (from the past 24 hours)   ECG 12 Lead ED Triage Standing Order; Chest Pain    Collection Time: 11/26/24 10:39 AM   Result Value Ref Range    QT Interval 322 ms    QTC Interval 422 ms    Comprehensive Metabolic Panel    Collection Time: 11/26/24 10:42 AM    Specimen: Arm, Right; Blood   Result Value Ref Range    Glucose 100 (H) 65 - 99 mg/dL    BUN 7 6 - 20 mg/dL    Creatinine 0.96 0.57 - 1.00 mg/dL    Sodium 138 136 - 145 mmol/L    Potassium 4.2 3.5 - 5.2 mmol/L    Chloride 95 (L) 98 - 107 mmol/L    CO2 24.6 22.0 - 29.0 mmol/L    Calcium 10.3 8.6 - 10.5 mg/dL    Total Protein 8.0 6.0 - 8.5 g/dL    Albumin 4.1 3.5 - 5.2 g/dL    ALT (SGPT) 13 1 - 33 U/L    AST (SGOT) 21 1 - 32 U/L    Alkaline Phosphatase 130 (H) 39 - 117 U/L    Total Bilirubin 0.4 0.0 - 1.2 mg/dL    Globulin 3.9 gm/dL    A/G Ratio 1.1 g/dL    BUN/Creatinine Ratio 7.3 7.0 - 25.0    Anion Gap 18.4 (H) 5.0 - 15.0 mmol/L    eGFR 71.3 >60.0 mL/min/1.73   High Sensitivity Troponin T    Collection Time: 11/26/24 10:42 AM    Specimen: Arm, Right; Blood   Result Value Ref Range    HS Troponin T 6 <14 ng/L   Green Top (Gel)    Collection Time: 11/26/24 10:42 AM   Result Value Ref Range    Extra Tube Hold for add-ons.    Lavender Top    Collection Time: 11/26/24 10:42 AM   Result Value Ref Range    Extra Tube hold for add-on    Gold Top - SST    Collection Time: 11/26/24 10:42 AM   Result Value Ref Range    Extra Tube Hold for add-ons.    Light Blue Top    Collection Time: 11/26/24 10:42 AM   Result Value Ref Range    Extra Tube Hold for add-ons.    CBC Auto Differential    Collection Time: 11/26/24 10:42 AM    Specimen: Arm, Right; Blood   Result Value Ref Range    WBC 5.73 3.40 - 10.80 10*3/mm3    RBC 5.45 (H) 3.77 - 5.28 10*6/mm3    Hemoglobin 12.8 12.0 - 15.9 g/dL    Hematocrit 40.9 34.0 - 46.6 %    MCV 75.0 (L) 79.0 - 97.0 fL    MCH 23.5 (L) 26.6 - 33.0 pg    MCHC 31.3 (L) 31.5 - 35.7 g/dL    RDW 13.5 12.3 - 15.4 %    RDW-SD 35.9 (L) 37.0 - 54.0 fl    MPV 10.6 6.0 - 12.0 fL    Platelets 233 140 - 450 10*3/mm3    Neutrophil % 62.7 42.7 - 76.0 %    Lymphocyte % 28.6 19.6 - 45.3 %    Monocyte % 8.2 5.0 - 12.0 %    Eosinophil % 0.0 (L) 0.3 -  6.2 %    Basophil % 0.2 0.0 - 1.5 %    Immature Grans % 0.3 0.0 - 0.5 %    Neutrophils, Absolute 3.59 1.70 - 7.00 10*3/mm3    Lymphocytes, Absolute 1.64 0.70 - 3.10 10*3/mm3    Monocytes, Absolute 0.47 0.10 - 0.90 10*3/mm3    Eosinophils, Absolute 0.00 0.00 - 0.40 10*3/mm3    Basophils, Absolute 0.01 0.00 - 0.20 10*3/mm3    Immature Grans, Absolute 0.02 0.00 - 0.05 10*3/mm3    nRBC 0.0 0.0 - 0.2 /100 WBC   High Sensitivity Troponin T 2Hr    Collection Time: 11/26/24 12:33 PM    Specimen: Arm, Left; Blood   Result Value Ref Range    HS Troponin T 21 (H) <14 ng/L    Troponin T Delta 15 (C) >=-4 - <+4 ng/L         RADIOLOGY  XR Chest 1 View    Result Date: 11/26/2024  XR CHEST 1 VW-  HISTORY: 54-year-old female with chest pain and palpitations. Shortness of breath.  FINDINGS: There is no evidence for pneumonia, effusions, or CHF. Follow-up with 2 views of the chest is recommended.  This report was finalized on 11/26/2024 11:24 AM by Dr. Aleah Samuels M.D on Workstation: [id number]         MEDICATIONS GIVEN IN ER  Medications   sodium chloride 0.9 % flush 10 mL (has no administration in time range)   aspirin tablet 325 mg (0 mg Oral Hold 11/26/24 1146)   ipratropium-albuterol (DUO-NEB) nebulizer solution 3 mL (3 mL Nebulization Given 11/26/24 1259)         ORDERS PLACED DURING THIS VISIT:  Orders Placed This Encounter   Procedures    XR Chest 1 View    [facility] Draw    Comprehensive Metabolic Panel    High Sensitivity Troponin T    CBC Auto Differential    High Sensitivity Troponin T 2Hr    D-dimer, Quantitative    NPO Diet NPO Type: Strict NPO    Undress & Gown    Continuous Pulse Oximetry    Oxygen Therapy- Nasal Cannula; Titrate 1-6 LPM Per SpO2; 90 - 95%    ECG 12 Lead ED Triage Standing Order; Chest Pain    ECG 12 Lead ED Triage Standing Order; Chest Pain    Insert Peripheral IV    Initiate ED Observation Status    CBC & Differential    Green Top (Gel)    Lavender Top    Gold Top - SST    Light Blue Top          OUTPATIENT MEDICATION MANAGEMENT:  Current Facility-Administered Medications Ordered in Epic   Medication Dose Route Frequency Provider Last Rate Last Admin    aspirin tablet 325 mg  325 mg Oral Once Clifton Panda MD        sodium chloride 0.9 % flush 10 mL  10 mL Intravenous PRN Clifton Panda MD         Current Outpatient Medications Ordered in Epic   Medication Sig Dispense Refill    predniSONE (DELTASONE) 10 MG tablet Take 1 tablet by mouth Dose Adjusted By Provider As Needed. Take 4 tablets by mouth daily for 7 days, THEN 3 tablets daily for 7 days, THEN 2 tablets daily for 7 days, THEN 1 tablet daily for 7 days.      albuterol sulfate  (90 Base) MCG/ACT inhaler INHALE 1 TO 2 PUFFS BY MOUTH EVERY 4 TO 6 HOURS AS NEEDED FOR SHORTNESS OF BREATH OR WHEEZING      Breztri Aerosphere 160-9-4.8 MCG/ACT aerosol inhaler 2 puffs 2 (Two) Times a Day.      cholecalciferol (VITAMIN D3) 1.25 MG (56874 UT) capsule Take 1 capsule by mouth Every 7 (Seven) Days.      clotrimazole-betamethasone (LOTRISONE) 1-0.05 % cream       cyclobenzaprine (FLEXERIL) 10 MG tablet As Needed.      diazePAM (VALIUM) 5 MG tablet TAKE 1 TABLET BY MOUTH TWICE DAILY AS NEEDED FOR SEVERE ANIXETY      dicyclomine (BENTYL) 20 MG tablet Take 1 tablet by mouth Every 6 (Six) Hours As Needed for Abdominal Cramping. 12 tablet 0    hydrOXYzine (ATARAX) 50 MG tablet Take 1 tablet by mouth Every 8 (Eight) Hours As Needed. for anxiety (Patient not taking: Reported on 3/20/2024)      magnesium oxide (MAG-OX) 400 MG tablet magnesium oxide 400 mg (241.3 mg magnesium) tablet   TAKE 2 TABLETS BY MOUTH EVERY DAY AT BEDTIME FOR 30 DAYS      methocarbamol (ROBAXIN) 500 MG tablet Take 1 tablet by mouth 3 (Three) Times a Day. 21 tablet 0    minocycline (MINOCIN,DYNACIN) 50 MG capsule As Needed. (Patient not taking: Reported on 3/20/2024)      ondansetron ODT (ZOFRAN-ODT) 4 MG disintegrating tablet Take 1-2 tablets by mouth Every 8 (Eight) Hours As  Needed for Nausea or Vomiting. 15 tablet 0    pantoprazole (PROTONIX) 40 MG EC tablet Take 1 tablet by mouth Daily. 30 tablet 0         PROCEDURES  Procedures            PROGRESS, DATA ANALYSIS, CONSULTS, AND MEDICAL DECISION MAKING  All labs have been independently interpreted by me.  All radiology studies have been reviewed by me. All EKG's have been independently viewed and interpreted by me.  Discussion below represents my analysis of pertinent findings related to patient's condition, differential diagnosis, treatment plan and final disposition.    Differential diagnosis includes but is not limited to long-haul COVID problems, chronic fatigue, pneumonia, anemia, hepatic failure, renal failure.      ED Course as of 11/26/24 1350   Tue Nov 26, 2024   1139 EKG independently interpreted by me    Time 10:39 AM  Sinus tachycardia 103  Normal P waves and OK intervals  QRS-normal axis, normal QRS  ST, T waves-nonspecific changes  Not significant change when compared to 7/2024 [DB]   1200 Chest x-ray independently interpreted by me shows no obvious acute disease.    Official reading by Dr. Samuels is in agreement [DB]   1217 ED labs reviewed notable for normal CBC with normal white count, hemoglobin and platelet count.    Chemistries are fairly unremarkable with glucose of 100.  Hepatic and renal function is fairly unremarkable.  There is a mild anion gap elevation of doubtful clinical significance.    High sensitive troponin 6 would go against acute coronary syndrome. [DB]   1218 In summary this patient has multiple vague complaints have been ongoing since July.  Symptoms are likely related to long-haul COVID disease.  I do not see evidence of acute underlying cardiopulmonary problems.    Patient is requesting a DuoNeb treatment which I will go ahead and give although she is not really wheezing and O2 sats are running 100% on room air [DB]   1349 I discussed evaluation through this patient with Dayana Ulloa will admit to  the observation unit for further evaluation treatment [DB]   1350 ED HEART SCORE is 4 with elevated troponin of 21 [DB]      ED Course User Index  [DB] Clifton Panda MD             AS OF 13:50 EST VITALS:    BP - 116/77  HR - 111  TEMP - 99 °F (37.2 °C)  O2 SATS - 100%    COMPLEXITY OF CARE  54-year-old female with history of long COVID presents with shortness of breath, palpitations and vague chest discomfort.    Please see ED course above for my independent evaluation and interpretation of ED test including EKG, x-ray and emergency department laboratory analysis.    Labs notable for elevated high-sensitivity troponin of 21 with initial of 6 given a delta of 15.    Given troponin elevation will admit for observation admission and cardiology consultation.        DIAGNOSIS  Final diagnoses:   Shortness of breath   Chest pain, unspecified type   COVID-19 long hauler         DISPOSITION  ED Disposition       ED Disposition   Intended Admit    Condition   --    Comment   --                Please note that portions of this document were completed with a voice recognition program.    Note Disclaimer: At [facility], we believe that sharing information builds trust and better relationships. You are receiving this note because you recently visited [facility]. It is possible you will see health information before a provider has talked with you about it. This kind of information can be easy to misunderstand. To help you fully understand what it means for your health, we urge you to discuss this note with your provider.         Clifton Panda MD  11/26/24 5685       Clifton Panda MD  11/26/24 1355

## 2024-11-26 NOTE — ED TRIAGE NOTES
Patient to ED via pv from home. Patient c/o shortness of breath and heart palpitations x 1 month.

## 2024-11-26 NOTE — ED NOTES
Nursing report ED to floor  [facility]  54 y.o.  female    HPI :  HPI  Stated Reason for Visit: soa, heart palpitations    Chief Complaint  Chief Complaint   Patient presents with    Shortness of Breath    Palpitations       Admitting doctor:   Triny Moon MD    Admitting diagnosis:   The primary encounter diagnosis was Shortness of breath. Diagnoses of Chest pain, unspecified type and COVID-19 long hauler were also pertinent to this visit.    Code status:   Current Code Status       Date Active Code Status Order ID Comments User Context       Not on file            Allergies:   Clomipramine, Doxycycline, Flagyl [metronidazole], Tramadol, Erythromycin, and Sulfamethoxazole-trimethoprim    Isolation:   No active isolations    Intake and Output  No intake or output data in the 24 hours ending 11/26/24 1356    Weight:       11/26/24  1130   Weight: 71.7 kg (158 lb 1.1 oz)       Most recent vitals:   Vitals:    11/26/24 1232 11/26/24 1259 11/26/24 1303 11/26/24 1307   BP: 116/77      Pulse: 91 92 92 111   Resp:  18 18 18   Temp:       SpO2: 99% 100% 100% 100%   Weight:       Height:           Active LDAs/IV Access:   Lines, Drains & Airways       Active LDAs       None                    Labs (abnormal labs have a star):   Labs Reviewed   COMPREHENSIVE METABOLIC PANEL - Abnormal; Notable for the following components:       Result Value    Glucose 100 (*)     Chloride 95 (*)     Alkaline Phosphatase 130 (*)     Anion Gap 18.4 (*)     All other components within normal limits    Narrative:     GFR Normal >60  Chronic Kidney Disease <60  Kidney Failure <15     CBC WITH AUTO DIFFERENTIAL - Abnormal; Notable for the following components:    RBC 5.45 (*)     MCV 75.0 (*)     MCH 23.5 (*)     MCHC 31.3 (*)     RDW-SD 35.9 (*)     Eosinophil % 0.0 (*)     All other components within normal limits   HIGH SENSITIVITIY TROPONIN T 2HR - Abnormal; Notable for the following components:    HS Troponin T 21 (*)     Troponin T  Delta 15 (*)     All other components within normal limits    Narrative:     High Sensitive Troponin T Reference Range:  <14.0 ng/L- Negative Female for AMI  <22.0 ng/L- Negative Male for AMI  >=14 - Abnormal Female indicating possible myocardial injury.  >=22 - Abnormal Male indicating possible myocardial injury.   Clinicians would have to utilize clinical acumen, EKG, Troponin, and serial changes to determine if it is an Acute Myocardial Infarction or myocardial injury due to an underlying chronic condition.        TROPONIN - Normal    Narrative:     High Sensitive Troponin T Reference Range:  <14.0 ng/L- Negative Female for AMI  <22.0 ng/L- Negative Male for AMI  >=14 - Abnormal Female indicating possible myocardial injury.  >=22 - Abnormal Male indicating possible myocardial injury.   Clinicians would have to utilize clinical acumen, EKG, Troponin, and serial changes to determine if it is an Acute Myocardial Infarction or myocardial injury due to an underlying chronic condition.        RAINBOW DRAW    Narrative:     The following orders were created for panel order [place] Draw.  Procedure                               Abnormality         Status                     ---------                               -----------         ------                     Green Top (Gel)[[id number]]                                  Final result               Lavender Top[[id number]]                                     Final result               Gold Top - SST[[id number]]                                   Final result               Light Blue Top[[id number]]                                   Final result                 Please view results for these tests on the individual orders.   D-DIMER, QUANTITATIVE   CBC AND DIFFERENTIAL    Narrative:     The following orders were created for panel order CBC & Differential.  Procedure                               Abnormality         Status                     ---------                                -----------         ------                     CBC Auto Differential[[id number]]        Abnormal            Final result                 Please view results for these tests on the individual orders.   GREEN TOP   LAVENDER TOP   GOLD TOP - SST   LIGHT BLUE TOP       EKG:   ECG 12 Lead ED Triage Standing Order; Chest Pain   Preliminary Result   HEART WFPE=888  bpm   RR Xgtfgtcr=697  ms   ID Cymsngez=712  ms   P Horizontal Axis=-4  deg   P Front Axis=86  deg   QRSD Interval=75  ms   QT Zyxavebb=741  ms   TQuI=965  ms   QRS Axis=21  deg   T Wave Axis=26  deg   - ABNORMAL ECG -   Sinus tachycardia   Probable  left atrial enlargement   Probable  anterior infarct, old   Baseline wander in lead(s) II   Date and Time of Study:2024-11-26 10:39:48          Meds given in ED:   Medications   sodium chloride 0.9 % flush 10 mL (has no administration in time range)   aspirin tablet 325 mg (0 mg Oral Hold 11/26/24 1146)   ipratropium-albuterol (DUO-NEB) nebulizer solution 3 mL (3 mL Nebulization Given 11/26/24 1259)       Imaging results:  No radiology results for the last day    Ambulatory status:   - stand-by assist.    Social issues:   Social History     Socioeconomic History    Marital status:    Tobacco Use    Smoking status: Never    Smokeless tobacco: Never   Vaping Use    Vaping status: Never Used   Substance and Sexual Activity    Alcohol use: Yes     Comment: Socially    Drug use: Yes     Types: Marijuana    Sexual activity: Defer       Peripheral Neurovascular  Peripheral Neurovascular (Adult)  Peripheral Neurovascular WDL: WDL    Neuro Cognitive  Neuro Cognitive (Adult)  Cognitive/Neuro/Behavioral WDL: WDL    Learning  Learning Assessment  Learning Readiness and Ability: no barriers identified    Respiratory  Respiratory WDL  Respiratory WDL: .WDL except, rhythm/pattern  Rhythm/Pattern, Respiratory: shortness of breath  Breath Sounds  All Lung Fields Breath Sounds: All Fields  All Lung Fields Breath Sounds:  diminished, Posterior:  Breath Sounds Post-Respiratory Treatment  Breath Sounds Posttreatment All Fields: All Fields  Breath Sounds Posttreatment All Fields: Anterior:, no change    Abdominal Pain  Safety Interventions  Safety Precautions/Falls Reduction: assistive device/personal items within reach, fall reduction program maintained, nonskid shoes/slippers when out of bed    Pain Assessments  Pain (Adult)  (0-10) Pain Rating: Rest: 9  Pain Location: back, abdomen, chest  Pain Side/Orientation: generalized  Pain Description: constant    NIH Stroke Scale       Naya Finley RN  11/26/24 13:56 EST

## 2024-11-27 ENCOUNTER — APPOINTMENT (OUTPATIENT)
Dept: CT IMAGING | Facility: HOSPITAL | Age: 54
End: 2024-11-27
Payer: MEDICAID

## 2024-11-27 ENCOUNTER — APPOINTMENT (OUTPATIENT)
Dept: CARDIOLOGY | Facility: HOSPITAL | Age: 54
End: 2024-11-27
Payer: MEDICAID

## 2024-11-27 ENCOUNTER — APPOINTMENT (OUTPATIENT)
Dept: GENERAL RADIOLOGY | Facility: HOSPITAL | Age: 54
End: 2024-11-27
Payer: MEDICAID

## 2024-11-27 PROBLEM — R10.9 ABDOMINAL PAIN: Status: ACTIVE | Noted: 2024-11-27

## 2024-11-27 PROBLEM — R00.2 PALPITATIONS: Status: ACTIVE | Noted: 2024-11-27

## 2024-11-27 PROBLEM — R07.89 CHEST PAIN, ATYPICAL: Status: ACTIVE | Noted: 2024-11-27

## 2024-11-27 LAB
ANION GAP SERPL CALCULATED.3IONS-SCNC: 15 MMOL/L (ref 5–15)
AORTIC DIMENSIONLESS INDEX: 0.7 (DI)
BH CV ECHO MEAS - ACS: 1.63 CM
BH CV ECHO MEAS - AO MAX PG: 7 MMHG
BH CV ECHO MEAS - AO MEAN PG: 3.7 MMHG
BH CV ECHO MEAS - AO ROOT DIAM: 2.7 CM
BH CV ECHO MEAS - AO V2 MAX: 132.5 CM/SEC
BH CV ECHO MEAS - AO V2 VTI: 21.8 CM
BH CV ECHO MEAS - AVA(I,D): 1.71 CM2
BH CV ECHO MEAS - EDV(CUBED): 56.6 ML
BH CV ECHO MEAS - EDV(MOD-SP2): 27 ML
BH CV ECHO MEAS - EDV(MOD-SP4): 38 ML
BH CV ECHO MEAS - EF(MOD-BP): 51.5 %
BH CV ECHO MEAS - EF(MOD-SP2): 51.9 %
BH CV ECHO MEAS - EF(MOD-SP4): 52.6 %
BH CV ECHO MEAS - ESV(CUBED): 17 ML
BH CV ECHO MEAS - ESV(MOD-SP2): 13 ML
BH CV ECHO MEAS - ESV(MOD-SP4): 18 ML
BH CV ECHO MEAS - FS: 33 %
BH CV ECHO MEAS - IVS/LVPW: 0.69 CM
BH CV ECHO MEAS - IVSD: 0.6 CM
BH CV ECHO MEAS - LAT PEAK E' VEL: 13.1 CM/SEC
BH CV ECHO MEAS - LV MASS(C)D: 77.8 GRAMS
BH CV ECHO MEAS - LV MAX PG: 3.5 MMHG
BH CV ECHO MEAS - LV MEAN PG: 1.78 MMHG
BH CV ECHO MEAS - LV V1 MAX: 93.4 CM/SEC
BH CV ECHO MEAS - LV V1 VTI: 16.1 CM
BH CV ECHO MEAS - LVIDD: 3.8 CM
BH CV ECHO MEAS - LVIDS: 2.6 CM
BH CV ECHO MEAS - LVOT AREA: 2.31 CM2
BH CV ECHO MEAS - LVOT DIAM: 1.71 CM
BH CV ECHO MEAS - LVPWD: 0.87 CM
BH CV ECHO MEAS - MED PEAK E' VEL: 11.1 CM/SEC
BH CV ECHO MEAS - MV A DUR: 0.13 SEC
BH CV ECHO MEAS - MV A MAX VEL: 71.1 CM/SEC
BH CV ECHO MEAS - MV DEC SLOPE: 307.2 CM/SEC2
BH CV ECHO MEAS - MV DEC TIME: 0.25 SEC
BH CV ECHO MEAS - MV E MAX VEL: 76.3 CM/SEC
BH CV ECHO MEAS - MV E/A: 1.07
BH CV ECHO MEAS - PA ACC TIME: 0.09 SEC
BH CV ECHO MEAS - PA V2 MAX: 99.9 CM/SEC
BH CV ECHO MEAS - PULM A REVS DUR: 0.1 SEC
BH CV ECHO MEAS - PULM A REVS VEL: 30.8 CM/SEC
BH CV ECHO MEAS - PULM DIAS VEL: 42.8 CM/SEC
BH CV ECHO MEAS - PULM S/D: 1.19
BH CV ECHO MEAS - PULM SYS VEL: 51 CM/SEC
BH CV ECHO MEAS - QP/QS: 0.79
BH CV ECHO MEAS - RV MAX PG: 1.42 MMHG
BH CV ECHO MEAS - RV V1 MAX: 59.6 CM/SEC
BH CV ECHO MEAS - RV V1 VTI: 12.1 CM
BH CV ECHO MEAS - RVOT DIAM: 1.76 CM
BH CV ECHO MEAS - SV(LVOT): 37.2 ML
BH CV ECHO MEAS - SV(MOD-SP2): 14 ML
BH CV ECHO MEAS - SV(MOD-SP4): 20 ML
BH CV ECHO MEAS - SV(RVOT): 29.5 ML
BH CV ECHO MEAS - TAPSE (>1.6): 1.8 CM
BH CV ECHO MEASUREMENTS AVERAGE E/E' RATIO: 6.31
BH CV XLRA - TDI S': 11.7 CM/SEC
BUN SERPL-MCNC: 7 MG/DL (ref 6–20)
BUN/CREAT SERPL: 9 (ref 7–25)
CALCIUM SPEC-SCNC: 9.6 MG/DL (ref 8.6–10.5)
CHLORIDE SERPL-SCNC: 98 MMOL/L (ref 98–107)
CHOLEST SERPL-MCNC: 198 MG/DL (ref 0–200)
CO2 SERPL-SCNC: 25 MMOL/L (ref 22–29)
CREAT SERPL-MCNC: 0.78 MG/DL (ref 0.57–1)
CRP SERPL-MCNC: 0.85 MG/DL (ref 0–0.5)
DEPRECATED RDW RBC AUTO: 35.7 FL (ref 37–54)
EGFRCR SERPLBLD CKD-EPI 2021: 91.5 ML/MIN/1.73
ERYTHROCYTE [DISTWIDTH] IN BLOOD BY AUTOMATED COUNT: 13.3 % (ref 12.3–15.4)
ERYTHROCYTE [SEDIMENTATION RATE] IN BLOOD: 38 MM/HR (ref 0–30)
GLUCOSE SERPL-MCNC: 70 MG/DL (ref 65–99)
HCT VFR BLD AUTO: 39.2 % (ref 34–46.6)
HDLC SERPL-MCNC: 60 MG/DL (ref 40–60)
HGB BLD-MCNC: 12.4 G/DL (ref 12–15.9)
LDLC SERPL CALC-MCNC: 127 MG/DL (ref 0–100)
LDLC/HDLC SERPL: 2.11 {RATIO}
LEFT ATRIUM VOLUME INDEX: 8.4 ML/M2
MAGNESIUM SERPL-MCNC: 1.9 MG/DL (ref 1.6–2.6)
MCH RBC QN AUTO: 23.9 PG (ref 26.6–33)
MCHC RBC AUTO-ENTMCNC: 31.6 G/DL (ref 31.5–35.7)
MCV RBC AUTO: 75.5 FL (ref 79–97)
PLATELET # BLD AUTO: 213 10*3/MM3 (ref 140–450)
PMV BLD AUTO: 10.7 FL (ref 6–12)
POTASSIUM SERPL-SCNC: 4.3 MMOL/L (ref 3.5–5.2)
QT INTERVAL: 322 MS
QT INTERVAL: 357 MS
QTC INTERVAL: 420 MS
QTC INTERVAL: 422 MS
RBC # BLD AUTO: 5.19 10*6/MM3 (ref 3.77–5.28)
SINUS: 2.49 CM
SODIUM SERPL-SCNC: 138 MMOL/L (ref 136–145)
TRIGL SERPL-MCNC: 57 MG/DL (ref 0–150)
TROPONIN T SERPL HS-MCNC: 8 NG/L
VLDLC SERPL-MCNC: 11 MG/DL (ref 5–40)
WBC NRBC COR # BLD AUTO: 6.65 10*3/MM3 (ref 3.4–10.8)

## 2024-11-27 PROCEDURE — 85027 COMPLETE CBC AUTOMATED: CPT | Performed by: NURSE PRACTITIONER

## 2024-11-27 PROCEDURE — 80048 BASIC METABOLIC PNL TOTAL CA: CPT | Performed by: NURSE PRACTITIONER

## 2024-11-27 PROCEDURE — 96376 TX/PRO/DX INJ SAME DRUG ADON: CPT

## 2024-11-27 PROCEDURE — G0378 HOSPITAL OBSERVATION PER HR: HCPCS

## 2024-11-27 PROCEDURE — 99204 OFFICE O/P NEW MOD 45 MIN: CPT | Performed by: INTERNAL MEDICINE

## 2024-11-27 PROCEDURE — 83735 ASSAY OF MAGNESIUM: CPT | Performed by: NURSE PRACTITIONER

## 2024-11-27 PROCEDURE — 93010 ELECTROCARDIOGRAM REPORT: CPT | Performed by: INTERNAL MEDICINE

## 2024-11-27 PROCEDURE — 74018 RADEX ABDOMEN 1 VIEW: CPT

## 2024-11-27 PROCEDURE — 71275 CT ANGIOGRAPHY CHEST: CPT

## 2024-11-27 PROCEDURE — 80061 LIPID PANEL: CPT | Performed by: NURSE PRACTITIONER

## 2024-11-27 PROCEDURE — 85652 RBC SED RATE AUTOMATED: CPT | Performed by: INTERNAL MEDICINE

## 2024-11-27 PROCEDURE — 93306 TTE W/DOPPLER COMPLETE: CPT

## 2024-11-27 PROCEDURE — 25010000002 ONDANSETRON PER 1 MG: Performed by: NURSE PRACTITIONER

## 2024-11-27 PROCEDURE — 25510000001 IOPAMIDOL PER 1 ML: Performed by: INTERNAL MEDICINE

## 2024-11-27 PROCEDURE — 84484 ASSAY OF TROPONIN QUANT: CPT | Performed by: NURSE PRACTITIONER

## 2024-11-27 PROCEDURE — 93005 ELECTROCARDIOGRAM TRACING: CPT | Performed by: NURSE PRACTITIONER

## 2024-11-27 PROCEDURE — 93306 TTE W/DOPPLER COMPLETE: CPT | Performed by: INTERNAL MEDICINE

## 2024-11-27 PROCEDURE — 86140 C-REACTIVE PROTEIN: CPT | Performed by: INTERNAL MEDICINE

## 2024-11-27 RX ORDER — IOPAMIDOL 755 MG/ML
100 INJECTION, SOLUTION INTRAVASCULAR
Status: COMPLETED | OUTPATIENT
Start: 2024-11-27 | End: 2024-11-27

## 2024-11-27 RX ORDER — MORPHINE SULFATE 2 MG/ML
2 INJECTION, SOLUTION INTRAMUSCULAR; INTRAVENOUS EVERY 4 HOURS PRN
Status: DISCONTINUED | OUTPATIENT
Start: 2024-11-27 | End: 2024-11-29 | Stop reason: HOSPADM

## 2024-11-27 RX ORDER — ATENOLOL 25 MG/1
12.5 TABLET ORAL
Status: DISCONTINUED | OUTPATIENT
Start: 2024-11-27 | End: 2024-11-27

## 2024-11-27 RX ORDER — DROPERIDOL 2.5 MG/ML
2.5 INJECTION, SOLUTION INTRAMUSCULAR; INTRAVENOUS ONCE
Status: DISCONTINUED | OUTPATIENT
Start: 2024-11-27 | End: 2024-11-29 | Stop reason: HOSPADM

## 2024-11-27 RX ORDER — OXYCODONE AND ACETAMINOPHEN 5; 325 MG/1; MG/1
1 TABLET ORAL EVERY 4 HOURS PRN
Status: DISCONTINUED | OUTPATIENT
Start: 2024-11-27 | End: 2024-11-29 | Stop reason: HOSPADM

## 2024-11-27 RX ORDER — IVABRADINE 5 MG/1
5 TABLET, FILM COATED ORAL 2 TIMES DAILY WITH MEALS
Status: DISCONTINUED | OUTPATIENT
Start: 2024-11-27 | End: 2024-11-29 | Stop reason: HOSPADM

## 2024-11-27 RX ADMIN — DICYCLOMINE HYDROCHLORIDE 10 MG: 10 CAPSULE ORAL at 20:25

## 2024-11-27 RX ADMIN — ONDANSETRON 4 MG: 2 INJECTION, SOLUTION INTRAMUSCULAR; INTRAVENOUS at 07:47

## 2024-11-27 RX ADMIN — IOPAMIDOL 90 ML: 755 INJECTION, SOLUTION INTRAVENOUS at 12:03

## 2024-11-27 RX ADMIN — DICYCLOMINE HYDROCHLORIDE 10 MG: 10 CAPSULE ORAL at 07:53

## 2024-11-27 RX ADMIN — Medication 10 ML: at 20:25

## 2024-11-27 RX ADMIN — Medication 10 ML: at 08:29

## 2024-11-27 RX ADMIN — OXYCODONE AND ACETAMINOPHEN 1 TABLET: 5; 325 TABLET ORAL at 18:22

## 2024-11-27 RX ADMIN — IVABRADINE 5 MG: 5 TABLET, FILM COATED ORAL at 13:31

## 2024-11-27 RX ADMIN — PANTOPRAZOLE SODIUM 40 MG: 40 TABLET, DELAYED RELEASE ORAL at 07:52

## 2024-11-27 RX ADMIN — DICYCLOMINE HYDROCHLORIDE 10 MG: 10 CAPSULE ORAL at 13:31

## 2024-11-27 NOTE — PROGRESS NOTES
ED OBSERVATION PROGRESS/DISCHARGE SUMMARY    Date of Admission: 11/26/2024   LOS: 0 days   PCP: Nina Hsieh APRN    Subjective: Continues to endorse palpitations.  Also says that she still has pain to her left lower quadrant which is more intense overnight.  Denies nausea, vomiting diarrhea.    Hospital Outcome:     54-year-old female admitted to the observation unit with a complaint of chest pain, and palpitations.      Initial workup in the emergency department shows high-sensitivity troponin of 6, 21, delta 15, glucose 100, alkaline phosphatase 130, D-dimer less than 0.27, RBCs 5.45, all other lab work is at baseline for the patient.  EKG shows sinus tachycardia, rate 103, nonischemic.  Chest x-ray shows no evidence for pneumonia, effusions, or CHF.    11/27/2024  High sensitive troponin was 6, 21, 16 and is back down to 8 today.  Orthostatics were mildly positive and heart rate went up to 130 with standing.  Cardiology was consulted, they have recommended an echocardiogram and are considering a tilt table.  They will arrange for follow-up with her 1 week after discharge.  She was started on Corlanor.    Gastroenterology was also consulted due to persistent abdominal pain, weight loss.  KUB was unremarkable.  She had a recent CT, will defer further imaging to GI service.  Plan for endoscopic evaluation on Friday.  I spoke with the hospitalist service, they will admit the patient to the hospital for further care.    ROS:  General: no fevers, chills  Respiratory: no cough, dyspnea  Cardiovascular: + chest pain, + palpitations  Abdomen: + abdominal pain, nausea, vomiting, or diarrhea  Neurologic: No focal weakness    Objective   Physical Exam:  I have reviewed the vital signs.  Temp:  [98.2 °F (36.8 °C)-99 °F (37.2 °C)] 98.4 °F (36.9 °C)  Heart Rate:  [] 130  Resp:  [16-20] 18  BP: (102-139)/(72-86) 102/80  General Appearance:    Alert, cooperative, no distress  Head:    Normocephalic,  atraumatic  Eyes:    Sclerae anicteric  Neck:   Supple, no mass  Lungs: Clear to auscultation bilaterally, respirations unlabored  Heart: Regular rate and rhythm, S1 and S2 normal, no murmur, rub or gallop  Abdomen:  Soft, tenderness left lower quadrant, no guarding, bowel sounds active, nondistended  Extremities: No clubbing, cyanosis, or edema to lower extremities  Pulses:  2+ and symmetric in distal lower extremities  Skin: No rashes   Neurologic: Oriented x3, Normal strength to extremities    Results Review:    I have reviewed the labs, radiology results and diagnostic studies.    Results from last 7 days   Lab Units 11/27/24  0443   WBC 10*3/mm3 6.65   HEMOGLOBIN g/dL 12.4   HEMATOCRIT % 39.2   PLATELETS 10*3/mm3 213     Results from last 7 days   Lab Units 11/27/24 0443 11/26/24  1042   SODIUM mmol/L 138 138   POTASSIUM mmol/L 4.3 4.2   CHLORIDE mmol/L 98 95*   CO2 mmol/L 25.0 24.6   BUN mg/dL 7 7   CREATININE mg/dL 0.78 0.96   CALCIUM mg/dL 9.6 10.3   BILIRUBIN mg/dL  --  0.4   ALK PHOS U/L  --  130*   ALT (SGPT) U/L  --  13   AST (SGOT) U/L  --  21   GLUCOSE mg/dL 70 100*     Imaging Results (Last 24 Hours)       Procedure Component Value Units Date/Time    XR Abdomen KUB [[id number]] Collected: 11/27/24 0949     Updated: 11/27/24 0953    Narrative:      XR ABDOMEN KUB-11/27/2024     HISTORY: Left lower quadrant pain.     2 images are submitted. The bowel gas pattern is unremarkable with no  evidence of free air or bowel dilatation on these 2 images. Small  radiopaque lead overlies the pelvis. Bony structures appear  unremarkable.       Impression:      1. No acute process.        This report was finalized on 11/27/2024 9:50 AM by Dr. Eliazar Dudley M.D on Workstation: [id number]       XR Chest 1 View [[id number]] Collected: 11/26/24 1123     Updated: 11/26/24 1127    Narrative:      XR CHEST 1 VW-     HISTORY: 54-year-old female with chest pain and palpitations. Shortness  of breath.     FINDINGS:  There is no evidence for pneumonia, effusions, or CHF.  Follow-up with 2 views of the chest is recommended.     This report was finalized on 11/26/2024 11:24 AM by Dr. Aleah Samuels M.D  on Workstation: [id number]               I have reviewed the medications.     Discharge Medications        ASK your doctor about these medications        Instructions Start Date   albuterol sulfate  (90 Base) MCG/ACT inhaler  Commonly known as: PROVENTIL HFA;VENTOLIN HFA;PROAIR HFA   INHALE 1 TO 2 PUFFS BY MOUTH EVERY 4 TO 6 HOURS AS NEEDED FOR SHORTNESS OF BREATH OR WHEEZING      Breztri Aerosphere 160-9-4.8 MCG/ACT aerosol inhaler  Generic drug: Budeson-Glycopyrrol-Formoterol   2 puffs, 2 Times Daily      cholecalciferol 1.25 MG (73153 UT) capsule  Commonly known as: VITAMIN D3   50,000 Units, Every 7 Days      clotrimazole-betamethasone 1-0.05 % cream  Commonly known as: LOTRISONE       dicyclomine 20 MG tablet  Commonly known as: BENTYL   20 mg, Oral, Every 6 Hours PRN      hydrOXYzine 50 MG tablet  Commonly known as: ATARAX   50 mg, Every 8 Hours PRN      magnesium oxide 400 MG tablet  Commonly known as: MAG-OX   magnesium oxide 400 mg (241.3 mg magnesium) tablet   TAKE 2 TABLETS BY MOUTH EVERY DAY AT BEDTIME FOR 30 DAYS      methocarbamol 500 MG tablet  Commonly known as: ROBAXIN   500 mg, Oral, 3 Times Daily      ondansetron ODT 4 MG disintegrating tablet  Commonly known as: ZOFRAN-ODT   4-8 mg, Oral, Every 8 Hours PRN      pantoprazole 40 MG EC tablet  Commonly known as: PROTONIX   40 mg, Oral, Daily      predniSONE 10 MG tablet  Commonly known as: DELTASONE  Ask about: Which instructions should I use?   10 mg, Titrated              ---------------------------------------------------------------------------------------------  Assessment & Plan   Assessment/Problem List    Dyspnea    Palpitations      Plan:    Chest pain/palpitations  -Continuous telemetry monitoring  -High-sensitivity troponin 6, 21, 16, 8  -EKG  nonischemic  -D-dimer negative  -Chest x-ray unremarkable  -Orthostatics mildly positive  -Cardiology consulted  -Plan for echocardiogram today  -Add Corlanor  -Consider tilt table    Left lower quadrant pain  Weight loss  -KUB unremarkable  -Had recent CT, will defer further imaging to GI  -GI consulted  -Plan for EGD and colonoscopy on 11/29     Asthma  -Without exacerbation  -Albuterol as needed    Disposition: Admit to hospital    This note will serve as a progress/transfer note    Dayana Ulloa, APRN 11/27/24 10:43 EST    I have worn appropriate PPE during this patient encounter, sanitized my hands both with entering and exiting patient's room.    55 minutes has been spent by [facility] providers in the care of this patient while under observation status

## 2024-11-27 NOTE — PLAN OF CARE
Goal Outcome Evaluation:               Waiting on a bed upstairws. Patients pain controlled with PRN meds. VSS at this time. Patient up to chair eating dinner.

## 2024-11-27 NOTE — H&P
[facility]   HISTORY AND PHYSICAL    Patient Name: Kaylie Perez  : 1970  MRN: [id number]  Primary Care Physician:  Nina Hsieh APRN  Date of admission: 2024    Subjective   Subjective     Chief Complaint:   Chief Complaint   Patient presents with    Shortness of Breath    Palpitations     HPI:    Kaylie Perez is a 54 y.o. female with past medical history seen for asthma who presented to the emergency department with complaints of chest pain, dyspnea and palpitations.  She reports she has long-haul COVID and last tested positive in 2024.  She has had difficulty eating and says that she is losing weight.  She sees pulmonology.  They recently prescribed her oral steroids, she says she was unable to tolerate them because she developed a rash.    High-sensitivity troponin was 6, 21 with a delta of 15.  EKG showed sinus tachycardia without evidence of acute ischemia.  X-ray showed no evidence of pneumonia, effusion or CHF.  CMP largely unremarkable, alk phos elevated at 130, this appears to be chronic.  CBC largely unremarkable.    She will be admitted to the observation unit for further management due to upward trending troponin.  We will consult cardiology.    Review of Systems   All systems were reviewed and negative except for: Those mentioned in HPI    Personal History     Past Medical History:   Diagnosis Date    Asthma     Chronic fatigue     Chronic pain     COVID-19 2023    BECKIE (obstructive sleep apnea)        History reviewed. No pertinent surgical history.    Family History: family history is not on file. Otherwise pertinent FHx was reviewed and not pertinent to current issue.    Social History:  reports that she has never smoked. She has never used smokeless tobacco. She reports current alcohol use. She reports current drug use. Drug: Marijuana.    Home Medications:  Budeson-Glycopyrrol-Formoterol, albuterol sulfate HFA, cholecalciferol,  clotrimazole-betamethasone, cyclobenzaprine, diazePAM, dicyclomine, hydrOXYzine, magnesium oxide, methocarbamol, minocycline, ondansetron ODT, pantoprazole, and predniSONE    Allergies:  Allergies   Allergen Reactions    Clomipramine Diarrhea    Doxycycline Nausea Only    Flagyl [Metronidazole] Nausea Only    Tramadol Other (See Comments)     Causes breathing issues. Increases sleep apnea episodes    Erythromycin Nausea Only     Severe nausea    Sulfamethoxazole-Trimethoprim Nausea And Vomiting       Objective   Objective     Vitals:   Temp:  [99 °F (37.2 °C)] 99 °F (37.2 °C)  Heart Rate:  [] 84  Resp:  [18-20] 20  BP: (115-129)/(75-86) 115/78  Physical Exam    Constitutional: Awake, alert   Eyes: PERRLA, sclerae anicteric, no conjunctival injection   HENT: NCAT, mucous membranes moist   Neck: Supple, no thyromegaly, no lymphadenopathy, trachea midline   Respiratory: Clear to auscultation bilaterally, nonlabored respirations    Cardiovascular: RRR, no murmurs, rubs, or gallops, palpable pedal pulses bilaterally   Gastrointestinal: Positive bowel sounds, soft, mild tenderness left lower quadrant, no guarding, nondistended   Musculoskeletal: No bilateral ankle edema, no clubbing or cyanosis to extremities   Psychiatric: Appropriate affect, cooperative   Neurologic: Oriented x 3, strength symmetric in all extremities, Cranial Nerves grossly intact to confrontation, speech clear   Skin: No rashes     Result Review    Result Review:  I have personally reviewed the results from the time of this admission to 11/26/2024 19:09 EST and agree with these findings:  [x]  Laboratory list / accordion  []  Microbiology  [x]  Radiology  [x]  EKG/Telemetry   []  Cardiology/Vascular   []  Pathology  []  Old records  []  Other:  Most notable findings include: Troponin 6, 21 delta 15.  Chloride 95, and anion gap 18.4, glucose 100, alk phos 130.      Assessment & Plan   Assessment / Plan     Brief Patient Summary:  Kaylie VERDUZCO  Ana is a 54 y.o. female who be admitted to the ED observation unit for further management due to chest pain, palpitations.    Active Hospital Problems:  Active Hospital Problems    Diagnosis     **Dyspnea      Plan:     Chest pain/palpitations  -Continuous telemetry monitoring  -High-sensitivity troponin 6, 21 with a delta of -1, continue to trend  -EKG nonischemic  -D-dimer negative  -Chest x-ray unremarkable  -Consult cardiology  -Check orthostatics    Asthma  -Without exacerbation  -Albuterol as needed    VTE Prophylaxis:  No VTE prophylaxis order currently exists.    CODE STATUS:    Level Of Support Discussed With: Patient  Code Status (Patient has no pulse and is not breathing): CPR (Attempt to Resuscitate)  Medical Interventions (Patient has pulse or is breathing): Full Support    Admission Status:  I believe this patient meets observation status.    Electronically signed by CHIQUIS Alanis, 11/26/24, 7:09 PM EST.    75 minutes has been spent by [facility] providers in the care of this patient while under observation status    I have worn appropriate PPE during this patient encounter, sanitized my hands both with entering and exiting patient's room.    I have discussed plan of care with patient including advance care plan and/or surrogate decision maker.  Patient advises that their daughter, Marge will be their primary surrogate decision maker

## 2024-11-27 NOTE — H&P
Patient Name:  Kaylie Perez  YOB: 1970  MRN:  [id number]  Admit Date:  11/26/2024  Patient Care Team:  Nina Hsieh APRN as PCP - General (Family Medicine)      Subjective   History Present Illness     Chief Complaint   Patient presents with    Shortness of Breath    Palpitations     Patient is a 54-year-old female with known history of asthma, long COVID presented to hospital with complaints of chest pain as well as abdominal discomfort.  Reportedly had third episode of COVID was in July and since then she had constant chest pain described as pressure/heavy sensation and radiating to the back.  States pain is on a daily basis and varies in severity and duration.  She does have associated shortness of breath and states with minimal activity she becomes tachycardic.  Her symptoms have been much worse since first of this month therefore decided to come to the emergency room.  EKG with no acute ST or T wave changes and troponin 21->16->8 additionally she is also been complaining of left-sided abdominal pain, intermittent, worse with food which include meat and processed food.  No reports of any blood in stools and does admit to 20 pound weight loss.  Underwent CT scan on November 4 which revealed diverticulosis and gastric wall thickening but nothing significant otherwise.  Of note she was scheduled for a stress test but could not tolerated earlier this year.  At the time of my evaluation she is lying on the bed and resting comfortably and does not appear to be any distress.    Review of Systems   A 12 system review has been performed and they are negative other than mentioned in the H&P    Personal History     Past Medical History:   Diagnosis Date    Asthma     Chronic fatigue     Chronic pain     COVID-19 02/2023    BECKIE (obstructive sleep apnea)      History reviewed. No pertinent surgical history.  History reviewed. No pertinent family history.  Social History     Tobacco Use     Smoking status: Never    Smokeless tobacco: Never   Vaping Use    Vaping status: Never Used   Substance Use Topics    Alcohol use: Yes     Comment: Socially    Drug use: Yes     Types: Marijuana     No current facility-administered medications on file prior to encounter.     Current Outpatient Medications on File Prior to Encounter   Medication Sig Dispense Refill    albuterol sulfate  (90 Base) MCG/ACT inhaler INHALE 1 TO 2 PUFFS BY MOUTH EVERY 4 TO 6 HOURS AS NEEDED FOR SHORTNESS OF BREATH OR WHEEZING      cholecalciferol (VITAMIN D3) 1.25 MG (72221 UT) capsule Take 1 capsule by mouth Every 7 (Seven) Days.      dicyclomine (BENTYL) 20 MG tablet Take 1 tablet by mouth Every 6 (Six) Hours As Needed for Abdominal Cramping. 12 tablet 0    methocarbamol (ROBAXIN) 500 MG tablet Take 1 tablet by mouth 3 (Three) Times a Day. 21 tablet 0    ondansetron ODT (ZOFRAN-ODT) 4 MG disintegrating tablet Take 1-2 tablets by mouth Every 8 (Eight) Hours As Needed for Nausea or Vomiting. 15 tablet 0    pantoprazole (PROTONIX) 40 MG EC tablet Take 1 tablet by mouth Daily. 30 tablet 0    predniSONE (DELTASONE) 10 MG tablet Take 1 tablet by mouth Dose Adjusted By Provider As Needed. Take 4 tablets by mouth daily for 7 days, THEN 3 tablets daily for 7 days, THEN 2 tablets daily for 7 days, THEN 1 tablet daily for 7 days.      Breztri Aerosphere 160-9-4.8 MCG/ACT aerosol inhaler 2 puffs 2 (Two) Times a Day.      clotrimazole-betamethasone (LOTRISONE) 1-0.05 % cream       hydrOXYzine (ATARAX) 50 MG tablet Take 1 tablet by mouth Every 8 (Eight) Hours As Needed. for anxiety (Patient not taking: Reported on 3/20/2024)      magnesium oxide (MAG-OX) 400 MG tablet magnesium oxide 400 mg (241.3 mg magnesium) tablet   TAKE 2 TABLETS BY MOUTH EVERY DAY AT BEDTIME FOR 30 DAYS      [DISCONTINUED] cyclobenzaprine (FLEXERIL) 10 MG tablet As Needed.      [DISCONTINUED] diazePAM (VALIUM) 5 MG tablet TAKE 1 TABLET BY MOUTH TWICE DAILY AS NEEDED FOR  SEVERE ANIXETY      [DISCONTINUED] minocycline (MINOCIN,DYNACIN) 50 MG capsule As Needed. (Patient not taking: Reported on 3/20/2024)       Allergies   Allergen Reactions    Clomipramine Diarrhea    Doxycycline Nausea Only    Flagyl [Metronidazole] Nausea Only    Tramadol Other (See Comments)     Causes breathing issues. Increases sleep apnea episodes    Erythromycin Nausea Only     Severe nausea    Sulfamethoxazole-Trimethoprim Nausea And Vomiting       Objective    Objective     Vital Signs  Temp:  [98.2 °F (36.8 °C)-99 °F (37.2 °C)] 98.4 °F (36.9 °C)  Heart Rate:  [] 130  Resp:  [16-20] 18  BP: (102-139)/(72-86) 102/80  SpO2:  [95 %-100 %] 98 %  on  Flow (L/min) (Oxygen Therapy):  [2] 2;   Device (Oxygen Therapy): room air  Body mass index is 29.1 kg/m².    Physical Exam  HEENT: PERRLA, extraocular movements intact, Scleras no icterus  Neck: Supple, no JVD  Cardiovascular: Regular rate and rhythm with normal S1 and S2  Respiratory: Fairly clear to auscultation bilaterally with no wheezes  GI: Soft, tender in the left lower quadrant, no guarding, no organomegaly, bowel sounds are present  Extremities: No edema, palpable pulses  Neurologic: Grossly nonfocal, no facial asymmetry    Results Review:  I reviewed the patient's new clinical results.  I reviewed the patient's new imaging results and agree with the interpretation.  I reviewed the patient's other test results and agree with the interpretation  I personally viewed and interpreted the patient's EKG/Telemetry data  Discussed with ED provider.    Lab Results (last 24 hours)       Procedure Component Value Units Date/Time    High Sensitivity Troponin T 2Hr [[id number]]  (Abnormal) Collected: 11/26/24 1233    Specimen: Blood from Arm, Left Updated: 11/26/24 1305     HS Troponin T 21 ng/L      Troponin T Delta 15 ng/L     Narrative:      High Sensitive Troponin T Reference Range:  <14.0 ng/L- Negative Female for AMI  <22.0 ng/L- Negative Male for AMI  >=14 -  Abnormal Female indicating possible myocardial injury.  >=22 - Abnormal Male indicating possible myocardial injury.   Clinicians would have to utilize clinical acumen, EKG, Troponin, and serial changes to determine if it is an Acute Myocardial Infarction or myocardial injury due to an underlying chronic condition.         High Sensitivity Troponin T [[id number]]  (Abnormal) Collected: 11/26/24 1747    Specimen: Blood from Arm, Left Updated: 11/26/24 1819     HS Troponin T 16 ng/L     Narrative:      High Sensitive Troponin T Reference Range:  <14.0 ng/L- Negative Female for AMI  <22.0 ng/L- Negative Male for AMI  >=14 - Abnormal Female indicating possible myocardial injury.  >=22 - Abnormal Male indicating possible myocardial injury.   Clinicians would have to utilize clinical acumen, EKG, Troponin, and serial changes to determine if it is an Acute Myocardial Infarction or myocardial injury due to an underlying chronic condition.         Basic Metabolic Panel [[id number]]  (Normal) Collected: 11/27/24 0443    Specimen: Blood from Arm, Left Updated: 11/27/24 0541     Glucose 70 mg/dL      BUN 7 mg/dL      Creatinine 0.78 mg/dL      Sodium 138 mmol/L      Potassium 4.3 mmol/L      Comment: Slight hemolysis detected by analyzer. Result may be falsely elevated.        Chloride 98 mmol/L      CO2 25.0 mmol/L      Calcium 9.6 mg/dL      BUN/Creatinine Ratio 9.0     Anion Gap 15.0 mmol/L      eGFR 91.5 mL/min/1.73     Narrative:      GFR Normal >60  Chronic Kidney Disease <60  Kidney Failure <15      CBC (No Diff) [[id number]]  (Abnormal) Collected: 11/27/24 0443    Specimen: Blood from Arm, Left Updated: 11/27/24 0508     WBC 6.65 10*3/mm3      RBC 5.19 10*6/mm3      Hemoglobin 12.4 g/dL      Hematocrit 39.2 %      MCV 75.5 fL      MCH 23.9 pg      MCHC 31.6 g/dL      RDW 13.3 %      RDW-SD 35.7 fl      MPV 10.7 fL      Platelets 213 10*3/mm3     High Sensitivity Troponin T [[id number]]  (Normal) Collected: 11/27/24 0443     Specimen: Blood from Arm, Left Updated: 11/27/24 0541     HS Troponin T 8 ng/L     Narrative:      High Sensitive Troponin T Reference Range:  <14.0 ng/L- Negative Female for AMI  <22.0 ng/L- Negative Male for AMI  >=14 - Abnormal Female indicating possible myocardial injury.  >=22 - Abnormal Male indicating possible myocardial injury.   Clinicians would have to utilize clinical acumen, EKG, Troponin, and serial changes to determine if it is an Acute Myocardial Infarction or myocardial injury due to an underlying chronic condition.         Lipid Panel [[id number]]  (Abnormal) Collected: 11/27/24 0443    Specimen: Blood from Arm, Left Updated: 11/27/24 0541     Total Cholesterol 198 mg/dL      Triglycerides 57 mg/dL      HDL Cholesterol 60 mg/dL      LDL Cholesterol  127 mg/dL      VLDL Cholesterol 11 mg/dL      LDL/HDL Ratio 2.11    Narrative:      Cholesterol Reference Ranges  (U.S. Department of Health and Human Services ATP III Classifications)    Desirable          <200 mg/dL  Borderline High    200-239 mg/dL  High Risk          >240 mg/dL      Triglyceride Reference Ranges  (U.S. Department of Health and Human Services ATP III Classifications)    Normal           <150 mg/dL  Borderline High  150-199 mg/dL  High             200-499 mg/dL  Very High        >500 mg/dL    HDL Reference Ranges  (U.S. Department of Health and Human Services ATP III Classifications)    Low     <40 mg/dl (major risk factor for CHD)  High    >60 mg/dl ('negative' risk factor for CHD)        LDL Reference Ranges  (U.S. Department of Health and Human Services ATP III Classifications)    Optimal          <100 mg/dL  Near Optimal     100-129 mg/dL  Borderline High  130-159 mg/dL  High             160-189 mg/dL  Very High        >189 mg/dL    Magnesium [[id number]]  (Normal) Collected: 11/27/24 0443    Specimen: Blood from Arm, Left Updated: 11/27/24 0541     Magnesium 1.9 mg/dL     Sedimentation Rate [[id number]]  (Abnormal) Collected:  11/27/24 0443    Specimen: Blood from Arm, Left Updated: 11/27/24 1035     Sed Rate 38 mm/hr     C-reactive Protein [[id number]]  (Abnormal) Collected: 11/27/24 0443    Specimen: Blood from Arm, Left Updated: 11/27/24 1006     C-Reactive Protein 0.85 mg/dL             Imaging Results (Last 24 Hours)       Procedure Component Value Units Date/Time    XR Abdomen KUB [[id number]] Collected: 11/27/24 0949     Updated: 11/27/24 0953    Narrative:      XR ABDOMEN KUB-11/27/2024     HISTORY: Left lower quadrant pain.     2 images are submitted. The bowel gas pattern is unremarkable with no  evidence of free air or bowel dilatation on these 2 images. Small  radiopaque lead overlies the pelvis. Bony structures appear  unremarkable.       Impression:      1. No acute process.        This report was finalized on 11/27/2024 9:50 AM by Dr. Eliazar Dudley M.D on Workstation: [id number]               Results for orders placed during the hospital encounter of 11/01/18    Adult Stress Echo W/ Cont or Stress Agent if Necessary Per Protocol    Interpretation Summary  · Normal stress echo with no significant echocardiographic evidence for myocardial ischemia.  · Left ventricular systolic function is normal. Calculated EF = 55%. Estimated EF was in agreement with the calculated EF. Normal left ventricular cavity size and wall thickness noted. All left ventricular wall segments contract normally. There is E/A reversal, which may be due to sinus tachycardia.      ECG 12 Lead Chest Pain   Final Result   HEART RATE=83  bpm   RR Iccbqmmy=553  ms   WA Cnyfevtp=989  ms   P Horizontal Axis=58  deg   P Front Axis=91  deg   QRSD Interval=72  ms   QT Rvqgjrwa=138  ms   SBuV=413  ms   QRS Axis=35  deg   T Wave Axis=37  deg   - NORMAL ECG -   Sinus rhythm   No change from previous tracing   Electronically Signed By: Mark Garcias (JASMIN) 2024-11-27 07:59:51   Date and Time of Study:2024-11-27 07:16:02      ECG 12 Lead ED Triage Standing Order;  Chest Pain   Final Result   HEART PROV=561  bpm   RR Nlcrdggo=615  ms   [place] Hniptkbc=167  ms   P Horizontal Axis=-4  deg   P Front Axis=86  deg   QRSD Interval=75  ms   QT Hriwucyt=495  ms   HBiA=670  ms   QRS Axis=21  deg   T Wave Axis=26  deg   - ABNORMAL ECG -   Sinus tachycardia   No change from previous tracing   Electronically Signed By: Portillo VerdugoJASMIN) ([facility]) 2024-11-27 05:51:39   Date and Time of Study:2024-11-26 10:39:48      Telemetry Scan   Final Result      Telemetry Scan   Final Result           Assessment/Plan     Active Hospital Problems    Diagnosis  POA    **Dyspnea [R06.00]  Yes    Palpitations [R00.2]  Yes    Chest pain, atypical [R07.89]  Unknown    Abdominal pain [R10.9]  Unknown      Resolved Hospital Problems   No resolved problems to display.       1.Atypical chest pain/dyspnea/tachycardia, could be related to long COVID however 2D echo is being obtained and cardiology consult has also been obtained.  Tilt table test is also being considered.  Will obtain CT chest to rule out pulmonary etiologies including PE.  2.  Abdominal pain, nonspecific.  GI did evaluate and plan is for EGD and colonoscopy on Friday.  3.  Weight loss, check TSH level.  4 history of asthma, currently not in any exacerbation and oxygenating quite well and not in any respiratory distress.  5.  Long COVID, possible that all her above symptoms are related to long COVID syndrome.  6.  On SCDs for DVT prophylaxis.  7.  CODE STATUS is full code.       Albert Lawton MD  [facility]  11/27/24  11:33 EST

## 2024-11-27 NOTE — CONSULTS
visited pt this am secondary to a referral in the chart for an advanced directives consult.  In the room, this  reviewed the document with the pt in detail and answered all of her questions.  The pt stated that she wished to think about her decision until a later time; this  informed me that pastoral care would be happy to assist her at a later time.

## 2024-11-27 NOTE — PLAN OF CARE
Goal Outcome Evaluation:      Pt in for SOB, pt O2 stable throughout the night, NPO since midnight, bed low, wheels locked, call light in reach

## 2024-11-27 NOTE — CONSULTS
Gastroenterology   Initial Inpatient Consult Note  Thank you for asking our opinion on this patient.  Referring Provider: Raul Gómez MD    Reason for Consultation: Abdominal pain    Subjective     History of present illness:    54 y.o. female that we are asked to see for abdominal pain.  Patient has significant recent medical history of ongoing issues poss related to COVID was shortness of breath and chest pain as well as abdominal pain.  Has had 3 CT scans in the last 3 months of her abdomen.  She has been found to have some benign lesions in the liver as well as a lipoma in the duodenum and some diverticulosis of the colon but nothing else significant.  She describes pain in the left mid abdomen around to the left flank.  She also has occasional right upper quadrant discomfort unrelated to meals.  She also complains of early satiety and fullness and lack of appetite with weight loss.    Past Medical History:  Past Medical History:   Diagnosis Date    Asthma     Chronic fatigue     Chronic pain     COVID-19 02/2023    BECKIE (obstructive sleep apnea)      Past Surgical History:  History reviewed. No pertinent surgical history.   Social History:   Social History     Tobacco Use    Smoking status: Never    Smokeless tobacco: Never   Substance Use Topics    Alcohol use: Yes     Comment: Socially      Family History:  History reviewed. No pertinent family history.    Home Meds:  Medications Prior to Admission   Medication Sig Dispense Refill Last Dose/Taking    albuterol sulfate  (90 Base) MCG/ACT inhaler INHALE 1 TO 2 PUFFS BY MOUTH EVERY 4 TO 6 HOURS AS NEEDED FOR SHORTNESS OF BREATH OR WHEEZING   11/26/2024 Morning    cholecalciferol (VITAMIN D3) 1.25 MG (30252 UT) capsule Take 1 capsule by mouth Every 7 (Seven) Days.   Past Week    dicyclomine (BENTYL) 20 MG tablet Take 1 tablet by mouth Every 6 (Six) Hours As Needed for Abdominal Cramping. 12 tablet 0 Past Week    methocarbamol (ROBAXIN) 500 MG tablet  Take 1 tablet by mouth 3 (Three) Times a Day. 21 tablet 0 Past Month    ondansetron ODT (ZOFRAN-ODT) 4 MG disintegrating tablet Take 1-2 tablets by mouth Every 8 (Eight) Hours As Needed for Nausea or Vomiting. 15 tablet 0 11/25/2024 Evening    pantoprazole (PROTONIX) 40 MG EC tablet Take 1 tablet by mouth Daily. 30 tablet 0 11/25/2024 Morning    predniSONE (DELTASONE) 10 MG tablet Take 1 tablet by mouth Dose Adjusted By Provider As Needed. Take 4 tablets by mouth daily for 7 days, THEN 3 tablets daily for 7 days, THEN 2 tablets daily for 7 days, THEN 1 tablet daily for 7 days.   Past Week    Breztri Aerosphere 160-9-4.8 MCG/ACT aerosol inhaler 2 puffs 2 (Two) Times a Day.       clotrimazole-betamethasone (LOTRISONE) 1-0.05 % cream        hydrOXYzine (ATARAX) 50 MG tablet Take 1 tablet by mouth Every 8 (Eight) Hours As Needed. for anxiety (Patient not taking: Reported on 3/20/2024)       magnesium oxide (MAG-OX) 400 MG tablet magnesium oxide 400 mg (241.3 mg magnesium) tablet   TAKE 2 TABLETS BY MOUTH EVERY DAY AT BEDTIME FOR 30 DAYS        Current Meds:   aspirin, 325 mg, Oral, Once  dicyclomine, 10 mg, Oral, 4x Daily  droperidol, 2.5 mg, Intravenous, Once  pantoprazole, 40 mg, Oral, Daily  sodium chloride, 10 mL, Intravenous, Q12H      Allergies:  Allergies   Allergen Reactions    Clomipramine Diarrhea    Doxycycline Nausea Only    Flagyl [Metronidazole] Nausea Only    Tramadol Other (See Comments)     Causes breathing issues. Increases sleep apnea episodes    Erythromycin Nausea Only     Severe nausea    Sulfamethoxazole-Trimethoprim Nausea And Vomiting     Review of Systems  Pertinent items are noted in HPI, all other systems reviewed and negative    Objective     Vital Signs  Temp:  [98.2 °F (36.8 °C)-99 °F (37.2 °C)] 98.4 °F (36.9 °C)  Heart Rate:  [] 104  Resp:  [16-20] 18  BP: (115-139)/(75-86) 127/81    Physical Exam:  CONSTITUTIONAL:  today's vital signs reviewed  EARS NOSE THROAT: trachea midline and  no deformity of the nares  EYES: no scleral icterus  GASTROINTESTINAL: abdomen is soft, nontender, nondistended with normal active bowel sounds, no masses are appreciated  PSYCHIATRIC: appropriate mood and affect  RESPIRATORY: normal inspiratory effort with no increased work of breathing  NEUROLOGIC: patient is awake and alert  DERMATOLOGIC: skin is warm with no cyanosis  LYMPHATIC: no periumbilical lymphadenopathy     Results Review:   I reviewed the patient's new clinical results.    Results from last 7 days   Lab Units 11/27/24  0443 11/26/24  1042   WBC 10*3/mm3 6.65 5.73   HEMOGLOBIN g/dL 12.4 12.8   HEMATOCRIT % 39.2 40.9   PLATELETS 10*3/mm3 213 233     Results from last 7 days   Lab Units 11/27/24  0443 11/26/24  1042   SODIUM mmol/L 138 138   POTASSIUM mmol/L 4.3 4.2   CHLORIDE mmol/L 98 95*   CO2 mmol/L 25.0 24.6   BUN mg/dL 7 7   CREATININE mg/dL 0.78 0.96   CALCIUM mg/dL 9.6 10.3   BILIRUBIN mg/dL  --  0.4   ALK PHOS U/L  --  130*   ALT (SGPT) U/L  --  13   AST (SGOT) U/L  --  21   GLUCOSE mg/dL 70 100*         Lab Results   Lab Value Date/Time    LIPASE 15 08/29/2024 0946    LIPASE 18 02/17/2022 1513       Radiology:  XR Chest 1 View   Final Result      XR Abdomen KUB    (Results Pending)       Assessment & Plan   Active Hospital Problems    Diagnosis     **Dyspnea        Assessment:  Abdominal pain.  Symptoms are nonspecific.  Several recent CTs have not shown anything acute.  Early satiety and fullness.  History of recurrent COVID infection  Shortness of breath  Chest pain.  Currently being evaluated by cardiology.  Diverticulosis of the colon with no evidence of diverticulitis  Benign duodenal lipoma    Plan:  Will await results of pulmonary and cardiac evaluation.  If okay with those services, will tentatively plan an EGD and colonoscopy on 11/29  Recommend outpatient gastric emptying study to evaluate for possible gastroparesis  We will follow-up      I discussed the patients findings and my  recommendations with patient and nursing staff.    MD Julian Gama M.D.  [facility], [facility] [place] [address]  Office: [phone]

## 2024-11-27 NOTE — NURSING NOTE
"Pt complaining of pain throughout the night, initially no PRN's ordered. CHIQUIS Wilson notified and PRN Tylenol ordered. Pt states that tylenol aggravates her stomach and would like Norco stating that she takes it at home, last filled dose was in September for 12 tablets per provider, pt refusing tylenol. At 2300, pt called out in pain, provider notified, Toradol ordered. Toradol initially refused by patient, but then later administered per pt request. Pt in pain at 0300, and requesting to have morphine, stating they \"had it on previous admissions\" and want to know why they can't have during this stay. CHIQUIS Wilson went bedside to pt to speak with patient regarding pain management appropriate for presenting symptoms and provider plan of care. Droperidol ordered for pt to assist with presenting symptoms, pt declined medication due to unfamiliarity with medication, RN explained side effect but pt states \"I've went this long, I can wait it out\".         "

## 2024-11-27 NOTE — PROGRESS NOTES
CRUZ HUNTLEY Attestation Note    I supervised care provided by the midlevel provider.    The YARELY and I have discussed this patient's history, physical exam, and treatment plan. I have reviewed the documentation and personally had a face to face interaction with the patient  I affirm the documentation and agree with the treatment and plan. I provided a substantive portion of the care of this patient.  I personally performed the physical exam, in its entirety.  My personal findings are documented in below:    History:  54-year-old female presenting for evaluation of chest discomfort and abdominal discomfort. Patient has a history of long-haul COVID for which she follows with pulmonology at [facility].  Patient's initial presenting pain was midsternal chest discomfort but she has subsequently developed epigastric discomfort.    Physical Exam:  General: No acute distress.  HENT: NCAT  Eyes: no scleral icterus.  Neck: Painless range of motion  CV: Pink warm and well-perfused throughout  Respiratory: No distress or increased work of breathing  Abdomen: soft, tenderness throughout the epigastrium  Musculoskeletal: no deformity.  Neuro: Alert, speech fluent and easily intelligible  Skin: warm, dry.    Assessment and Plan:  54-year-old female presenting for evaluation of chest pain, abdominal pain  - Cardiology consult  - GI consult  - EKG nonischemic  - Troponin 6, 21, 16, 8  - Continue symptomatic management

## 2024-11-27 NOTE — CONSULTS
Patient Name: Kaylie Perez  :1970  54 y.o.    Date of Admission: 2024  Date of Consultation:  24  Encounter Provider: CHIQUIS Yu  Place of Service: [facility] CARDIOLOGY  Referring Provider: No ref. provider found  Patient Care Team:  Nina Hsieh APRN as PCP - General (Family Medicine)      Chief complaint: chest pain, shortness of breath, palpitations      History of Present Illness: Kaylie Perez is 54 year old female with a and long COVID. She follows with pulmonary. She has no prior cardiac history.       She presented to the ER yesterday with worsening shortness of breath and midsternal chest discomfort.  High-sensitivity troponin 6, 21, 16, 8.  EKG shows normal sinus rhythm with no ischemic changes. CXR unremarkable.     Reportedly she was recently prescribed prednisone by pulmonology but was unable to tolerate due to GI side effects.    Her problems really started back in 2020 when she was first diagnosed with COVID.  She contracted COVID again in 2023.  Following that infection, she was off work for 7 months.  Her last infection was in July of this year. She reports ongoing issues with shortness of breath, chest pain, palpitations, fatigue, and brain fog.  She has been [place] cardiologist [place].  She says he ordered a treadmill stress test but she could not tolerate it.  She also reports significant issues with her stomach including ongoing abdominal pain.  She does have diverticulosis.  She also reports a history of thalassemia.    Past Medical History:   Diagnosis Date    Asthma     Chronic fatigue     Chronic pain     COVID-19 2023    BECKIE (obstructive sleep apnea)        History reviewed. No pertinent surgical history.      Prior to Admission medications    Medication Sig Start Date End Date Taking? Authorizing Provider   albuterol sulfate  (90 Base) MCG/ACT inhaler INHALE 1 TO 2 PUFFS BY  MOUTH EVERY 4 TO 6 HOURS AS NEEDED FOR SHORTNESS OF BREATH OR WHEEZING 10/18/23  Yes Buddy Corley MD   cholecalciferol (VITAMIN D3) 1.25 MG (07226 UT) capsule Take 1 capsule by mouth Every 7 (Seven) Days.   Yes Buddy Corley MD   dicyclomine (BENTYL) 20 MG tablet Take 1 tablet by mouth Every 6 (Six) Hours As Needed for Abdominal Cramping. 8/29/24  Yes Naya Jordan PA-C   methocarbamol (ROBAXIN) 500 MG tablet Take 1 tablet by mouth 3 (Three) Times a Day. 10/26/24  Yes Grayson Roland MD   ondansetron ODT (ZOFRAN-ODT) 4 MG disintegrating tablet Take 1-2 tablets by mouth Every 8 (Eight) Hours As Needed for Nausea or Vomiting. 8/29/24  Yes Naya Jordan PA-C   pantoprazole (PROTONIX) 40 MG EC tablet Take 1 tablet by mouth Daily. 11/4/24  Yes Doreen Tsang PA   predniSONE (DELTASONE) 10 MG tablet Take 1 tablet by mouth Dose Adjusted By Provider As Needed. Take 4 tablets by mouth daily for 7 days, THEN 3 tablets daily for 7 days, THEN 2 tablets daily for 7 days, THEN 1 tablet daily for 7 days. 11/22/24 12/20/24 Yes Buddy Corley MD Breztri Aerosphere 160-9-4.8 MCG/ACT aerosol inhaler 2 puffs 2 (Two) Times a Day.    Buddy Corley MD   clotrimazole-betamethasone (LOTRISONE) 1-0.05 % cream     Buddy Corley MD   hydrOXYzine (ATARAX) 50 MG tablet Take 1 tablet by mouth Every 8 (Eight) Hours As Needed. for anxiety  Patient not taking: Reported on 3/20/2024 7/24/23   Buddy Corley MD   magnesium oxide (MAG-OX) 400 MG tablet magnesium oxide 400 mg (241.3 mg magnesium) tablet   TAKE 2 TABLETS BY MOUTH EVERY DAY AT BEDTIME FOR 30 DAYS    Buddy Corley MD   cyclobenzaprine (FLEXERIL) 10 MG tablet As Needed. 8/14/18 11/27/24  Buddy Corley MD   diazePAM (VALIUM) 5 MG tablet TAKE 1 TABLET BY MOUTH TWICE DAILY AS NEEDED FOR SEVERE ANIXETY  11/27/24  Provider, MD Buddy   minocycline (MINOCIN,DYNACIN) 50 MG capsule As Needed.  Patient not taking:  Reported on 3/20/2024 5/21/18 11/27/24  Provider, Buddy, MD       Allergies   Allergen Reactions    Clomipramine Diarrhea    Doxycycline Nausea Only    Flagyl [Metronidazole] Nausea Only    Tramadol Other (See Comments)     Causes breathing issues. Increases sleep apnea episodes    Erythromycin Nausea Only     Severe nausea    Sulfamethoxazole-Trimethoprim Nausea And Vomiting       Social History     Socioeconomic History    Marital status:    Tobacco Use    Smoking status: Never    Smokeless tobacco: Never   Vaping Use    Vaping status: Never Used   Substance and Sexual Activity    Alcohol use: Yes     Comment: Socially    Drug use: Yes     Types: Marijuana    Sexual activity: Defer       History reviewed. No pertinent family history.    REVIEW OF SYSTEMS:   Review of Systems   Constitutional: Positive for malaise/fatigue. Negative for chills and fever.   Cardiovascular:  Positive for chest pain, dyspnea on exertion and palpitations. Negative for leg swelling, near-syncope, orthopnea, paroxysmal nocturnal dyspnea and syncope.   Respiratory:  Positive for shortness of breath. Negative for cough.    Musculoskeletal:  Negative for joint pain, joint swelling and myalgias.   Gastrointestinal:  Positive for nausea. Negative for abdominal pain, diarrhea, melena and vomiting.   Genitourinary:  Negative for frequency and hematuria.   Neurological:  Negative for light-headedness, numbness, paresthesias and seizures.   Allergic/Immunologic: Negative.    All other systems reviewed and are negative.          Objective:     Vitals:    11/26/24 1922 11/26/24 2303 11/27/24 0300 11/27/24 0737   BP: 117/77 123/84 139/86 127/81   BP Location: Right arm Right arm Right arm Right arm   Patient Position: Lying Lying Lying Sitting   Pulse: 85 88  104   Resp: 16 18 18 18   Temp: 98.8 °F (37.1 °C) 98.2 °F (36.8 °C) 98.3 °F (36.8 °C) 98.4 °F (36.9 °C)   TempSrc: Oral Oral Oral Oral   SpO2: 95% 96%  98%   Weight:       Height:          Body mass index is 29.1 kg/m².    Physical Exam:  Constitutional:       General: Not in acute distress.     Appearance: Well-developed. Not diaphoretic.   Eyes:      Pupils: Pupils are equal, round, and reactive to light.   HENT:      Head: Normocephalic and atraumatic.   Neck:      Thyroid: No thyromegaly.      Vascular: No JVD.   Pulmonary:      Effort: Pulmonary effort is normal. No respiratory distress.      Breath sounds: Normal breath sounds.   Cardiovascular:      Normal rate. Regular rhythm.   Pulses:     Intact distal pulses.   Edema:     Peripheral edema absent.   Abdominal:      General: Bowel sounds are normal. There is no distension.      Palpations: Abdomen is soft. There is no hepatomegaly or splenomegaly.      Tenderness: There is no abdominal tenderness.   Musculoskeletal: Normal range of motion. Skin:     General: Skin is warm and dry.      Findings: No erythema.   Neurological:      Mental Status: Alert and oriented to person, place, and time.   Psychiatric:         Behavior: Behavior normal.         Judgment: Judgment normal.         Lab Review:   Results from last 7 days   Lab Units 11/27/24 0443 11/26/24  1042   SODIUM mmol/L 138 138   POTASSIUM mmol/L 4.3 4.2   CHLORIDE mmol/L 98 95*   CO2 mmol/L 25.0 24.6   BUN mg/dL 7 7   CREATININE mg/dL 0.78 0.96   GLUCOSE mg/dL 70 100*   CALCIUM mg/dL 9.6 10.3     Results from last 7 days   Lab Units 11/27/24  0443 11/26/24  1747 11/26/24  1233   HSTROP T ng/L 8 16* 21*     Results from last 7 days   Lab Units 11/27/24  0443 11/26/24  1042   WBC 10*3/mm3 6.65 5.73   HEMOGLOBIN g/dL 12.4 12.8   HEMATOCRIT % 39.2 40.9   PLATELETS 10*3/mm3 213 233         Results from last 7 days   Lab Units 11/27/24  0443   CHOLESTEROL mg/dL 198     Results from last 7 days   Lab Units 11/27/24  0443   MAGNESIUM mg/dL 1.9     Results from last 7 days   Lab Units 11/27/24  0443   CHOLESTEROL mg/dL 198   TRIGLYCERIDES mg/dL 57   HDL CHOL mg/dL 60   LDL CHOL mg/dL 127*      Previous EKG 7/2024    Admission EKG          I personally viewed and interpreted the patient's EKG/Telemetry data.      Current Facility-Administered Medications:     acetaminophen (TYLENOL) tablet 650 mg, 650 mg, Oral, Q6H PRN, SteveRadhahy S, APRN    albuterol (PROVENTIL) nebulizer solution 0.083% 2.5 mg/3mL, 2.5 mg, Nebulization, Q6H PRN, Blevens, Dayana C, APRN    aspirin tablet 325 mg, 325 mg, Oral, Once, Radha Wilsonhy S, APRN    dicyclomine (BENTYL) capsule 10 mg, 10 mg, Oral, 4x Daily, Steve, Cleopatra S, APRN, 10 mg at 11/27/24 0753    droperidol (INAPSINE) injection 2.5 mg, 2.5 mg, Intravenous, Once, Radha Wilsonhy S, APRN    nitroglycerin (NITROSTAT) SL tablet 0.4 mg, 0.4 mg, Sublingual, Q5 Min PRN, Blevens, Dayana C, APRN    ondansetron (ZOFRAN) injection 4 mg, 4 mg, Intravenous, Q6H PRN, Blevens, Dayana C, APRN, 4 mg at 11/27/24 0747    pantoprazole (PROTONIX) EC tablet 40 mg, 40 mg, Oral, Daily, Steve, Cleopatra S, APRN, 40 mg at 11/27/24 0752    sodium chloride 0.9 % flush 10 mL, 10 mL, Intravenous, PRN, Steve, Cleopatra S, APRN, 10 mL at 11/26/24 1717    sodium chloride 0.9 % flush 10 mL, 10 mL, Intravenous, Q12H, Blevens, Dayana C, APRN, 10 mL at 11/27/24 0829    sodium chloride 0.9 % flush 10 mL, 10 mL, Intravenous, PRN, Blevens, Dayana C, APRN    sodium chloride 0.9 % infusion 40 mL, 40 mL, Intravenous, PRN, Blevens, Dayana C, APRN    Assessment and Plan:       Active Hospital Problems    Diagnosis  POA    **Dyspnea [R06.00]  Yes      Resolved Hospital Problems   No resolved problems to display.         CHIQUIS Yu  11/27/24  08:48 EST      I have seen her in conjunction with Ailyn.  This is a young lady previously healthy but she has had COVID 3 times and each time she does she feels terrible for a period of time but this is the worst she has been she gets tachycardic with minimal activity short of breath she describes she is having chest pain but honestly I feel like it is more she notices her heart rate  just pounding in her chest.  She is also having significant GI issues and has had a 20 pound weight loss.  She does not smoke does not have diabetes or hypertension no hyperlipidemia has never had cardiac problems before.  Her troponins here were still in the normal range but 1 went up a little bit from 8-16 but then down to 6.  May be related to her tachycardia I got her up and walked her and she went from a rate of 79-1 50 with minimal some very slow activity.    I think she has long COVID and its affected her autonomic system I am going to asked that we get an echo I am going to see if I can check a couple inflammatory markers on her.  I think we could consider maybe down the road a course of steroids but if her inflammatory markers are high but from a cardiac standpoint and going to give her a little bit of atenolol and see if she can tolerate that and if that does not work she will probably need Corlanor will have a low threshold to do a tilt table test on her.  I think she can go home later today after she is seen by GI I will see her in the office in 1 week    Her blood pressure drops with standing from 1 20-1 02 so I do not think a beta-blocker is going to work.  She I would have started her on Corlanor and see if we can help her rate

## 2024-11-28 LAB
ANION GAP SERPL CALCULATED.3IONS-SCNC: 12.1 MMOL/L (ref 5–15)
BASOPHILS # BLD AUTO: 0.02 10*3/MM3 (ref 0–0.2)
BASOPHILS NFR BLD AUTO: 0.3 % (ref 0–1.5)
BUN SERPL-MCNC: 6 MG/DL (ref 6–20)
BUN/CREAT SERPL: 7.5 (ref 7–25)
CALCIUM SPEC-SCNC: 9.7 MG/DL (ref 8.6–10.5)
CHLORIDE SERPL-SCNC: 97 MMOL/L (ref 98–107)
CO2 SERPL-SCNC: 26.9 MMOL/L (ref 22–29)
CREAT SERPL-MCNC: 0.8 MG/DL (ref 0.57–1)
DEPRECATED RDW RBC AUTO: 35.1 FL (ref 37–54)
EGFRCR SERPLBLD CKD-EPI 2021: 88.8 ML/MIN/1.73
EOSINOPHIL # BLD AUTO: 0.02 10*3/MM3 (ref 0–0.4)
EOSINOPHIL NFR BLD AUTO: 0.3 % (ref 0.3–6.2)
ERYTHROCYTE [DISTWIDTH] IN BLOOD BY AUTOMATED COUNT: 13.2 % (ref 12.3–15.4)
GLUCOSE SERPL-MCNC: 83 MG/DL (ref 65–99)
HCT VFR BLD AUTO: 38.6 % (ref 34–46.6)
HGB BLD-MCNC: 11.9 G/DL (ref 12–15.9)
IMM GRANULOCYTES # BLD AUTO: 0.02 10*3/MM3 (ref 0–0.05)
IMM GRANULOCYTES NFR BLD AUTO: 0.3 % (ref 0–0.5)
LYMPHOCYTES # BLD AUTO: 2.35 10*3/MM3 (ref 0.7–3.1)
LYMPHOCYTES NFR BLD AUTO: 40.1 % (ref 19.6–45.3)
MCH RBC QN AUTO: 23.1 PG (ref 26.6–33)
MCHC RBC AUTO-ENTMCNC: 30.8 G/DL (ref 31.5–35.7)
MCV RBC AUTO: 75 FL (ref 79–97)
MONOCYTES # BLD AUTO: 0.6 10*3/MM3 (ref 0.1–0.9)
MONOCYTES NFR BLD AUTO: 10.2 % (ref 5–12)
NEUTROPHILS NFR BLD AUTO: 2.85 10*3/MM3 (ref 1.7–7)
NEUTROPHILS NFR BLD AUTO: 48.8 % (ref 42.7–76)
NRBC BLD AUTO-RTO: 0 /100 WBC (ref 0–0.2)
PLATELET # BLD AUTO: 208 10*3/MM3 (ref 140–450)
PMV BLD AUTO: 10.1 FL (ref 6–12)
POTASSIUM SERPL-SCNC: 4.4 MMOL/L (ref 3.5–5.2)
RBC # BLD AUTO: 5.15 10*6/MM3 (ref 3.77–5.28)
SODIUM SERPL-SCNC: 136 MMOL/L (ref 136–145)
TSH SERPL DL<=0.05 MIU/L-ACNC: 2.04 UIU/ML (ref 0.27–4.2)
WBC NRBC COR # BLD AUTO: 5.86 10*3/MM3 (ref 3.4–10.8)

## 2024-11-28 PROCEDURE — 84443 ASSAY THYROID STIM HORMONE: CPT | Performed by: INTERNAL MEDICINE

## 2024-11-28 PROCEDURE — G0378 HOSPITAL OBSERVATION PER HR: HCPCS

## 2024-11-28 PROCEDURE — 80048 BASIC METABOLIC PNL TOTAL CA: CPT | Performed by: INTERNAL MEDICINE

## 2024-11-28 PROCEDURE — 85025 COMPLETE CBC W/AUTO DIFF WBC: CPT | Performed by: INTERNAL MEDICINE

## 2024-11-28 PROCEDURE — 99214 OFFICE O/P EST MOD 30 MIN: CPT | Performed by: INTERNAL MEDICINE

## 2024-11-28 RX ORDER — POLYETHYLENE GLYCOL 3350 17 G/17G
0.5 POWDER, FOR SOLUTION ORAL EVERY 12 HOURS
Status: COMPLETED | OUTPATIENT
Start: 2024-11-28 | End: 2024-11-29

## 2024-11-28 RX ADMIN — IVABRADINE 5 MG: 5 TABLET, FILM COATED ORAL at 08:50

## 2024-11-28 RX ADMIN — IVABRADINE 5 MG: 5 TABLET, FILM COATED ORAL at 17:48

## 2024-11-28 RX ADMIN — PANTOPRAZOLE SODIUM 40 MG: 40 TABLET, DELAYED RELEASE ORAL at 08:50

## 2024-11-28 RX ADMIN — Medication 10 ML: at 21:48

## 2024-11-28 RX ADMIN — POLYETHYLENE GLYCOL 3350 0.5 BOTTLE: 17 POWDER, FOR SOLUTION ORAL at 17:48

## 2024-11-28 RX ADMIN — Medication 10 ML: at 08:51

## 2024-11-28 NOTE — PLAN OF CARE
Goal Outcome Evaluation:      VSS. HR improved with activity (max low 100s). Patient reports improved activity tolerance and ambulated in blount. Clear liquid diet. Bowel prep started this afternoon. Plans for EGD and Colonoscopy. Continuing to monitor.

## 2024-11-28 NOTE — PROGRESS NOTES
Name: Kaylie Perez ADMIT: 2024   : 1970  PCP: Nina Hsieh APRN    MRN: [id number] LOS: 0 days   AGE/SEX: 54 y.o. female  ROOM: /     Subjective   Subjective   Patient is lying on the bed and does not appear to be major distress.  Denies nausea, vomiting, chest pain, shortness of breath.  Complaining of left-sided abdominal discomfort.       Objective   Objective   Vital Signs  Temp:  [98 °F (36.7 °C)-99 °F (37.2 °C)] 98.3 °F (36.8 °C)  Heart Rate:  [] 83  Resp:  [16-18] 18  BP: (112-140)/(73-90) 125/73  SpO2:  [89 %-100 %] 100 %  on  Flow (L/min) (Oxygen Therapy):  [2] 2;   Device (Oxygen Therapy): room air  Body mass index is 29.1 kg/m².  Physical Exam  HEENT: PERRLA, extraocular movements intact, Scleras no icterus  Neck: Supple, no JVD  Cardiovascular: Regular rate and rhythm with normal S1 and S2  Respiratory: Fairly clear to auscultation bilaterally with no wheezes  GI: Soft, tender in the left lower quadrant, no guarding, no organomegaly, bowel sounds are present  Extremities: No edema, palpable pulses  Neurologic: Grossly nonfocal, no facial asymmetry    Results Review     I reviewed the patient's new clinical results.  Results from last 7 days   Lab Units 24  0332 243 24  1042   WBC 10*3/mm3 5.86 6.65 5.73   HEMOGLOBIN g/dL 11.9* 12.4 12.8   PLATELETS 10*3/mm3 208 213 233     Results from last 7 days   Lab Units 24  0332 24  0443 24  1042   SODIUM mmol/L 136 138 138   POTASSIUM mmol/L 4.4 4.3 4.2   CHLORIDE mmol/L 97* 98 95*   CO2 mmol/L 26.9 25.0 24.6   BUN mg/dL 6 7 7   CREATININE mg/dL 0.80 0.78 0.96   GLUCOSE mg/dL 83 70 100*   EGFR mL/min/1.73 88.8 91.5 71.3     Results from last 7 days   Lab Units 24  1042   ALBUMIN g/dL 4.1   BILIRUBIN mg/dL 0.4   ALK PHOS U/L 130*   AST (SGOT) U/L 21   ALT (SGPT) U/L 13     Results from last 7 days   Lab Units 24  0332 24  0443 24  1042   CALCIUM mg/dL 9.7  "9.6 10.3   ALBUMIN g/dL  --   --  4.1   MAGNESIUM mg/dL  --  1.9  --        No results found for: \"HGBA1C\", \"POCGLU\"    CT Angiogram Chest    Result Date: 11/27/2024  No evidence of pulmonary embolus.  Radiation dose reduction techniques were utilized, including automated exposure control and exposure modulation based on body size.   This report was finalized on 11/27/2024 8:21 PM by Dr. Eliazar Dudley M.D on Workstation: [id number])      XR Abdomen KUB    Result Date: 11/27/2024  1. No acute process.   This report was finalized on 11/27/2024 9:50 AM by Dr. Eliazar Dudley M.D on Workstation: [id number])       I have personally reviewed all medications:  Scheduled Medications  aspirin, 325 mg, Oral, Once  dicyclomine, 10 mg, Oral, 4x Daily  droperidol, 2.5 mg, Intravenous, Once  ivabradine HCl, 5 mg, Oral, BID With Meals  pantoprazole, 40 mg, Oral, Daily  polyethylene glycol, 0.5 bottle, Oral, Q12H  sodium chloride, 10 mL, Intravenous, Q12H    Infusions   Diet  Diet: Liquid; Clear Liquid; Fluid Consistency: Thin (IDDSI 0)  NPO Diet NPO Type: Strict NPO    I have personally reviewed:  [x]  Laboratory   [x]  Microbiology   [x]  Radiology   [x]  EKG/Telemetry  [x]  Cardiology/Vascular   []  Pathology    []  Records       Assessment/Plan     Active Hospital Problems    Diagnosis  POA    **Dyspnea [R06.00]  Yes    Palpitations [R00.2]  Yes    Chest pain, atypical [R07.89]  Unknown    Abdominal pain [R10.9]  Unknown      Resolved Hospital Problems   No resolved problems to display.       54 y.o. female admitted with Dyspnea.    1.Atypical chest pain/dyspnea/tachycardia, could be related to long COVID however 2D echo was done and revealed normal ejection fraction with no wall motion abnormality.  Cardiology is following along and tilt table test is also being considered.  CT of the chest with no acute findings and no evidence of any PE.    2.  Abdominal pain, nonspecific.  GI did evaluate and plan is for EGD and " colonoscopy on Friday.    3.  Weight loss, TSH level is normal.    4 history of asthma, currently not in any exacerbation and oxygenating quite well and not in any respiratory distress.    5.  Long COVID, possible that all her above symptoms are related to long COVID syndrome.    6.  On SCDs for DVT prophylaxis.    7.  CODE STATUS is full code.    Copy text on this note has been reviewed by me on 11/28/2024          Albert Lawton MD  [facility]  11/28/24  14:33 EST

## 2024-11-28 NOTE — PROGRESS NOTES
Gastroenterology   Inpatient Progress Note    Reason for Follow Up: Abdominal pain    Subjective     Interval History:   No new issues reported overnight.    Current Facility-Administered Medications:     acetaminophen (TYLENOL) tablet 650 mg, 650 mg, Oral, Q6H PRN, Cleopatra Wilson S APRN    albuterol (PROVENTIL) nebulizer solution 0.083% 2.5 mg/3mL, 2.5 mg, Nebulization, Q6H PRN, Viola Ulloaa C, APRN    aspirin tablet 325 mg, 325 mg, Oral, Once, Cleopatra Wilson S, APRN    dicyclomine (BENTYL) capsule 10 mg, 10 mg, Oral, 4x Daily, Radha Wilsonhy S, APRN, 10 mg at 11/27/24 2025    droperidol (INAPSINE) injection 2.5 mg, 2.5 mg, Intravenous, Once, Cleopatra Wilson APRN    ivabradine HCl (CORLANOR) tablet 5 mg, 5 mg, Oral, BID With Meals, Portillo Verdugo MD, 5 mg at 11/28/24 0850    morphine injection 2 mg, 2 mg, Intravenous, Q4H PRN, Albert Lawton MD    nitroglycerin (NITROSTAT) SL tablet 0.4 mg, 0.4 mg, Sublingual, Q5 Min PRN, Dayana Ulloa, APRN    ondansetron (ZOFRAN) injection 4 mg, 4 mg, Intravenous, Q6H PRN, Viola Ulloaa C, APRN, 4 mg at 11/27/24 0747    oxyCODONE-acetaminophen (PERCOCET) 5-325 MG per tablet 1 tablet, 1 tablet, Oral, Q4H PRN, Albert Lawton MD, 1 tablet at 11/27/24 1822    pantoprazole (PROTONIX) EC tablet 40 mg, 40 mg, Oral, Daily, Cleopatra Wilson S, APRN, 40 mg at 11/28/24 0850    sodium chloride 0.9 % flush 10 mL, 10 mL, Intravenous, PRN, Cleopatra Wilson S, APRN, 10 mL at 11/26/24 1717    sodium chloride 0.9 % flush 10 mL, 10 mL, Intravenous, Q12H, Viola Ulloaa NARENDRA, APRN, 10 mL at 11/28/24 0851    sodium chloride 0.9 % flush 10 mL, 10 mL, Intravenous, PRN, Blevens, Dayana C, APRN    sodium chloride 0.9 % infusion 40 mL, 40 mL, Intravenous, PRN, Blevens, Dayana C, APRN  Review of Systems:    The following systems were reviewed and negative;  gastrointestinal    Objective     Vital Signs  Temp:  [98 °F (36.7 °C)-99 °F (37.2 °C)] 99 °F (37.2 °C)  Heart Rate:  [] 98  Resp:  [16-18] 18  BP:  (102-140)/(72-90) 128/75  Body mass index is 29.1 kg/m².    Intake/Output Summary (Last 24 hours) at 11/28/2024 0925  Last data filed at 11/27/2024 2025  Gross per 24 hour   Intake 200 ml   Output --   Net 200 ml     No intake/output data recorded.     Physical Exam:   General: patient awake, alert and cooperative   Eyes: no scleral icterus   Skin: warm and dry, not jaundiced   Abdomen: soft, nontender, nondistended; normal bowel sounds, no masses palpated, no periumbical lymphadenopathy   Psychiatric: Appropriate affect and behavior     Results Review:     I reviewed the patient's new clinical results.    Results from last 7 days   Lab Units 11/28/24 0332 11/27/24 0443 11/26/24  1042   WBC 10*3/mm3 5.86 6.65 5.73   HEMOGLOBIN g/dL 11.9* 12.4 12.8   HEMATOCRIT % 38.6 39.2 40.9   PLATELETS 10*3/mm3 208 213 233     Results from last 7 days   Lab Units 11/28/24 0332 11/27/24 0443 11/26/24  1042   SODIUM mmol/L 136 138 138   POTASSIUM mmol/L 4.4 4.3 4.2   CHLORIDE mmol/L 97* 98 95*   CO2 mmol/L 26.9 25.0 24.6   BUN mg/dL 6 7 7   CREATININE mg/dL 0.80 0.78 0.96   CALCIUM mg/dL 9.7 9.6 10.3   BILIRUBIN mg/dL  --   --  0.4   ALK PHOS U/L  --   --  130*   ALT (SGPT) U/L  --   --  13   AST (SGOT) U/L  --   --  21   GLUCOSE mg/dL 83 70 100*         Lab Results   Lab Value Date/Time    LIPASE 15 08/29/2024 0946    LIPASE 18 02/17/2022 1513       Radiology:  CT Angiogram Chest   Final Result   No evidence of pulmonary embolus.       Radiation dose reduction techniques were utilized, including automated   exposure control and exposure modulation based on body size.           This report was finalized on 11/27/2024 8:21 PM by Dr. Eliazar Dudley M.D on Workstation: [id number]          XR Abdomen KUB   Final Result   1. No acute process.           This report was finalized on 11/27/2024 9:50 AM by Dr. 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  Recommend outpatient gastric emptying study.  I discussed the patients findings and my recommendations with patient and nursing staff.    MD Julian Gama M.D.  [facility] [place] [address]  Office: [phone]

## 2024-11-28 NOTE — PLAN OF CARE
Goal Outcome Evaluation:  Plan of Care Reviewed With: patient        Progress: no change  Outcome Evaluation: Pt admitted with abdominal pain & shortness of breath, Labs/imaging tests were negative for cardiac related events. Pt consulted to GI for EGD & colonoscopy on 11/29. VSS, no complaints of chest pain, NSR on monitor. Will continue to monitor

## 2024-11-28 NOTE — NURSING NOTE
Transport came to pick patient for inpatient transfer to CVU, via wheelchair. Patient is alert and orientedx4, uses own cpap at night, independent to the toilet, telemetry shows SR. Patient still reported intermittent abdominal cramping-PO Bentyl was given per order. Report was given to CVU-RN via phone call. All patient's belongings at bedside were sent with patient.

## 2024-11-29 ENCOUNTER — ANESTHESIA (OUTPATIENT)
Dept: GASTROENTEROLOGY | Facility: HOSPITAL | Age: 54
End: 2024-11-29
Payer: MEDICAID

## 2024-11-29 ENCOUNTER — ANESTHESIA EVENT (OUTPATIENT)
Dept: GASTROENTEROLOGY | Facility: HOSPITAL | Age: 54
End: 2024-11-29
Payer: MEDICAID

## 2024-11-29 VITALS
RESPIRATION RATE: 18 BRPM | WEIGHT: 154 LBS | SYSTOLIC BLOOD PRESSURE: 119 MMHG | HEIGHT: 61 IN | TEMPERATURE: 98.3 F | HEART RATE: 84 BPM | DIASTOLIC BLOOD PRESSURE: 79 MMHG | OXYGEN SATURATION: 95 % | BODY MASS INDEX: 29.07 KG/M2

## 2024-11-29 LAB
ANION GAP SERPL CALCULATED.3IONS-SCNC: 18 MMOL/L (ref 5–15)
BASOPHILS # BLD AUTO: 0.02 10*3/MM3 (ref 0–0.2)
BASOPHILS NFR BLD AUTO: 0.3 % (ref 0–1.5)
BUN SERPL-MCNC: 4 MG/DL (ref 6–20)
BUN/CREAT SERPL: 5.6 (ref 7–25)
CALCIUM SPEC-SCNC: 9.6 MG/DL (ref 8.6–10.5)
CHLORIDE SERPL-SCNC: 97 MMOL/L (ref 98–107)
CO2 SERPL-SCNC: 20 MMOL/L (ref 22–29)
CREAT SERPL-MCNC: 0.71 MG/DL (ref 0.57–1)
DEPRECATED RDW RBC AUTO: 35.4 FL (ref 37–54)
EGFRCR SERPLBLD CKD-EPI 2021: 102.5 ML/MIN/1.73
EOSINOPHIL # BLD AUTO: 0.01 10*3/MM3 (ref 0–0.4)
EOSINOPHIL NFR BLD AUTO: 0.2 % (ref 0.3–6.2)
ERYTHROCYTE [DISTWIDTH] IN BLOOD BY AUTOMATED COUNT: 13.4 % (ref 12.3–15.4)
GLUCOSE SERPL-MCNC: 81 MG/DL (ref 65–99)
HCT VFR BLD AUTO: 39.6 % (ref 34–46.6)
HGB BLD-MCNC: 12.1 G/DL (ref 12–15.9)
IMM GRANULOCYTES # BLD AUTO: 0.02 10*3/MM3 (ref 0–0.05)
IMM GRANULOCYTES NFR BLD AUTO: 0.3 % (ref 0–0.5)
LYMPHOCYTES # BLD AUTO: 2.22 10*3/MM3 (ref 0.7–3.1)
LYMPHOCYTES NFR BLD AUTO: 35.5 % (ref 19.6–45.3)
MCH RBC QN AUTO: 22.9 PG (ref 26.6–33)
MCHC RBC AUTO-ENTMCNC: 30.6 G/DL (ref 31.5–35.7)
MCV RBC AUTO: 74.9 FL (ref 79–97)
MONOCYTES # BLD AUTO: 0.59 10*3/MM3 (ref 0.1–0.9)
MONOCYTES NFR BLD AUTO: 9.4 % (ref 5–12)
NEUTROPHILS NFR BLD AUTO: 3.39 10*3/MM3 (ref 1.7–7)
NEUTROPHILS NFR BLD AUTO: 54.3 % (ref 42.7–76)
PLATELET # BLD AUTO: 232 10*3/MM3 (ref 140–450)
PMV BLD AUTO: 11.2 FL (ref 6–12)
POTASSIUM SERPL-SCNC: 3.8 MMOL/L (ref 3.5–5.2)
RBC # BLD AUTO: 5.29 10*6/MM3 (ref 3.77–5.28)
SODIUM SERPL-SCNC: 135 MMOL/L (ref 136–145)
WBC NRBC COR # BLD AUTO: 6.25 10*3/MM3 (ref 3.4–10.8)

## 2024-11-29 PROCEDURE — G0378 HOSPITAL OBSERVATION PER HR: HCPCS

## 2024-11-29 PROCEDURE — 88305 TISSUE EXAM BY PATHOLOGIST: CPT | Performed by: INTERNAL MEDICINE

## 2024-11-29 PROCEDURE — 85025 COMPLETE CBC W/AUTO DIFF WBC: CPT | Performed by: INTERNAL MEDICINE

## 2024-11-29 PROCEDURE — 80048 BASIC METABOLIC PNL TOTAL CA: CPT | Performed by: INTERNAL MEDICINE

## 2024-11-29 PROCEDURE — 25810000003 SODIUM CHLORIDE 0.9 % SOLUTION: Performed by: INTERNAL MEDICINE

## 2024-11-29 PROCEDURE — 45380 COLONOSCOPY AND BIOPSY: CPT | Performed by: INTERNAL MEDICINE

## 2024-11-29 PROCEDURE — 25010000002 LIDOCAINE 2% SOLUTION: Performed by: NURSE ANESTHETIST, CERTIFIED REGISTERED

## 2024-11-29 PROCEDURE — 25010000002 PROPOFOL 10 MG/ML EMULSION: Performed by: NURSE ANESTHETIST, CERTIFIED REGISTERED

## 2024-11-29 PROCEDURE — 43239 EGD BIOPSY SINGLE/MULTIPLE: CPT | Performed by: INTERNAL MEDICINE

## 2024-11-29 RX ORDER — IVABRADINE 5 MG/1
5 TABLET, FILM COATED ORAL 2 TIMES DAILY WITH MEALS
Qty: 60 TABLET | Refills: 0 | Status: SHIPPED | OUTPATIENT
Start: 2024-11-29 | End: 2024-12-29

## 2024-11-29 RX ORDER — SODIUM CHLORIDE 9 MG/ML
30 INJECTION, SOLUTION INTRAVENOUS CONTINUOUS PRN
Status: ACTIVE | OUTPATIENT
Start: 2024-11-29 | End: 2024-11-29

## 2024-11-29 RX ORDER — PROPOFOL 10 MG/ML
VIAL (ML) INTRAVENOUS AS NEEDED
Status: DISCONTINUED | OUTPATIENT
Start: 2024-11-29 | End: 2024-11-29 | Stop reason: SURG

## 2024-11-29 RX ORDER — LIDOCAINE HYDROCHLORIDE 20 MG/ML
INJECTION, SOLUTION INFILTRATION; PERINEURAL AS NEEDED
Status: DISCONTINUED | OUTPATIENT
Start: 2024-11-29 | End: 2024-11-29 | Stop reason: SURG

## 2024-11-29 RX ORDER — IVABRADINE 5 MG/1
5 TABLET, FILM COATED ORAL 2 TIMES DAILY WITH MEALS
Qty: 60 TABLET | Refills: 0 | Status: SHIPPED | OUTPATIENT
Start: 2024-11-29 | End: 2024-11-29

## 2024-11-29 RX ADMIN — PANTOPRAZOLE SODIUM 40 MG: 40 TABLET, DELAYED RELEASE ORAL at 15:51

## 2024-11-29 RX ADMIN — LIDOCAINE HYDROCHLORIDE 100 MG: 20 INJECTION, SOLUTION INFILTRATION; PERINEURAL at 11:42

## 2024-11-29 RX ADMIN — PROPOFOL 100 MG: 10 INJECTION, EMULSION INTRAVENOUS at 11:42

## 2024-11-29 RX ADMIN — PROPOFOL 50 MG: 10 INJECTION, EMULSION INTRAVENOUS at 11:49

## 2024-11-29 RX ADMIN — PROPOFOL 30 MG: 10 INJECTION, EMULSION INTRAVENOUS at 11:55

## 2024-11-29 RX ADMIN — PROPOFOL 40 MG: 10 INJECTION, EMULSION INTRAVENOUS at 11:52

## 2024-11-29 RX ADMIN — POLYETHYLENE GLYCOL 3350 0.5 BOTTLE: 17 POWDER, FOR SOLUTION ORAL at 06:32

## 2024-11-29 RX ADMIN — Medication 10 ML: at 10:20

## 2024-11-29 RX ADMIN — PROPOFOL 30 MG: 10 INJECTION, EMULSION INTRAVENOUS at 11:58

## 2024-11-29 RX ADMIN — SODIUM CHLORIDE 30 ML/HR: 9 INJECTION, SOLUTION INTRAVENOUS at 10:56

## 2024-11-29 RX ADMIN — PROPOFOL 50 MG: 10 INJECTION, EMULSION INTRAVENOUS at 11:44

## 2024-11-29 RX ADMIN — IVABRADINE 5 MG: 5 TABLET, FILM COATED ORAL at 16:37

## 2024-11-29 RX ADMIN — IVABRADINE 5 MG: 5 TABLET, FILM COATED ORAL at 09:46

## 2024-11-29 NOTE — DISCHARGE SUMMARY
Patient Name: Kaylie Perez  : 1970  MRN: [id number]    Date of Admission: 2024  Date of Discharge:  2024  Primary Care Physician: Nina Hsieh APRN      Chief Complaint:   Shortness of Breath and Palpitations      Discharge Diagnoses     Active Hospital Problems    Diagnosis  POA   • **Dyspnea [R06.00]  Yes   • Palpitations [R00.2]  Yes   • Chest pain, atypical [R07.89]  Unknown   • Abdominal pain [R10.9]  Unknown      Resolved Hospital Problems   No resolved problems to display.        Hospital Course   Patient is a 54-year-old female with known history of asthma, long COVID presented to hospital with complaints of chest pain as well as abdominal discomfort.  Reportedly had third episode of COVID was in July and since then she had constant chest pain described as pressure/heavy sensation and radiating to the back.  States pain is on a daily basis and varies in severity and duration.  She does have associated shortness of breath and states with minimal activity she becomes tachycardic.  Her symptoms have been much worse since first of this month therefore decided to come to the emergency room.  EKG with no acute ST or T wave changes and troponin 21->16->8 additionally she is also been complaining of left-sided abdominal pain, intermittent, worse with food which include meat and processed food.  No reports of any blood in stools and does admit to 20 pound weight loss.  Underwent CT scan on  which revealed diverticulosis and gastric wall thickening but nothing significant otherwise.  Of note she was scheduled for a stress test but could not tolerated earlier this year.  At the time of my evaluation she is lying on the bed and resting comfortably and does not appear to be any distress.     1.Atypical chest pain/dyspnea/tachycardia, could be related to long COVID however 2D echo was done and revealed normal ejection fraction with no wall motion abnormality.  Cardiology is  following along and tilt table test is also being considered.  CT of the chest with no acute findings and no evidence of any PE.     2.  Abdominal pain, nonspecific.  GI did evaluate and underwent EGD and colonoscopy earlier today which did not reveal any significant findings.  Her pain is much better and tolerable.  Able to tolerate p.o. diet without any difficulty.     3.  Weight loss, TSH level is normal.     4 history of asthma, currently not in any exacerbation and oxygenating quite well and not in any respiratory distress.     5.  Long COVID with tachycardia, possible that all her above symptoms are related to long COVID syndrome.  Patient has been initiated on Corlanor and heart rate is now much better controlled.  Will follow-up with cardiology and pulmonary as an outpatient basis.    Copy text on his note has been reviewed by me on 11/29/2024.    Day of Discharge         Physical Exam:  Temp:  [97.9 °F (36.6 °C)-98.4 °F (36.9 °C)] 98.3 °F (36.8 °C)  Heart Rate:  [] 83  Resp:  [12-18] 18  BP: ()/(63-93) 131/93  Body mass index is 29.1 kg/m².  Physical Exam  HEENT: PERRLA, extraocular movements intact, Scleras no icterus  Neck: Supple, no JVD  Cardiovascular: Regular rate and rhythm with normal S1 and S2  Respiratory: Fairly clear to auscultation bilaterally with no wheezes  GI: Soft, very minimal tenderness in the left lower quadrant, no guarding, no organomegaly, bowel sounds are present  Extremities: No edema, palpable pulses  Neurologic: Grossly nonfocal, no facial asymmetry  Consultants     Consult Orders (all) (From admission, onward)       Start     Ordered    11/29/24 1021  Inpatient Nutrition Consult  Once        Provider:  (Not yet assigned)    11/29/24 1021    11/27/24 1012  Inpatient Hospitalist Consult  Once        Specialty:  Hospitalist  Provider:  Albert Lawton MD    11/27/24 1011    11/27/24 0827  Inpatient Gastroenterology Consult  Once        Specialty:  Gastroenterology   Provider:  Julian Rene MD    11/27/24 0827 11/26/24 2110  Inpatient Consult to Advance Care Planning  Once        Provider:  (Not yet assigned)    11/26/24 2109 11/26/24 1701  Inpatient Cardiology Consult  Once        Specialty:  Cardiology  Provider:  Jessee Fernández MD    11/26/24 1701                  Procedures     ESOPHAGOGASTRODUODENOSCOPY with cold focep biopsies, COLONOSCOPY to cecum with cold biopsy polypectomy    Imaging Results (All)       Procedure Component Value Units Date/Time    CT Angiogram Chest [[id number]] Collected: 11/27/24 1212     Updated: 11/27/24 2026    Narrative:      CT ANGIOGRAM OF THE CHEST WITH CONTRAST INCLUDING RECONSTRUCTION IMAGES  11/27/2024     HISTORY: Shortness of breath. Possible pulmonary embolus.     Following the intravenous contrast injection, CT angiography was  performed through the chest. Sagittal, coronal and 3D reconstruction  images were reviewed.     The pulmonary arterial system is well opacified with no evidence of  pulmonary embolus. There is a minimal linear band of atelectasis or scar  in the left lower lobe. No suspicious-appearing lung masses are seen.     No pathologically enlarged hilar or mediastinal lymph nodes are seen.  The upper abdomen is unremarkable.       Impression:      No evidence of pulmonary embolus.     Radiation dose reduction techniques were utilized, including automated  exposure control and exposure modulation based on body size.        This report was finalized on 11/27/2024 8:21 PM by Dr. Eliazar Dudley M.D on Workstation: [id number]       XR Abdomen KUB [[id number]] Collected: 11/27/24 0949     Updated: 11/27/24 0953    Narrative:      XR ABDOMEN KUB-11/27/2024     HISTORY: Left lower quadrant pain.     2 images are submitted. The bowel gas pattern is unremarkable with no  evidence of free air or bowel dilatation on these 2 images. Small  radiopaque lead overlies the pelvis. Bony structures  appear  unremarkable.       Impression:      1. No acute process.        This report was finalized on 11/27/2024 9:50 AM by Dr. Eliazar Dudley M.D on Workstation: [id number]       XR Chest 1 View [[id number]] Collected: 11/26/24 1123     Updated: 11/26/24 1127    Narrative:      XR CHEST 1 VW-     HISTORY: 54-year-old female with chest pain and palpitations. Shortness  of breath.     FINDINGS: There is no evidence for pneumonia, effusions, or CHF.  Follow-up with 2 views of the chest is recommended.     This report was finalized on 11/26/2024 11:24 AM by Dr. Aleah Samuels M.D  on Workstation: [id number]               Results for orders placed during the hospital encounter of 11/26/24    Adult Transthoracic Echo Complete W/ Cont if Necessary Per Protocol    Interpretation Summary  •  Left ventricular systolic function is normal. Calculated left ventricular EF = 51.5%  •  Left ventricular diastolic function was normal.  •  Normal echo    Pertinent Labs     Results from last 7 days   Lab Units 11/29/24  0256 11/28/24  0332 11/27/24 0443 11/26/24  1042   WBC 10*3/mm3 6.25 5.86 6.65 5.73   HEMOGLOBIN g/dL 12.1 11.9* 12.4 12.8   PLATELETS 10*3/mm3 232 208 213 233     Results from last 7 days   Lab Units 11/29/24  0256 11/28/24  0332 11/27/24 0443 11/26/24  1042   SODIUM mmol/L 135* 136 138 138   POTASSIUM mmol/L 3.8 4.4 4.3 4.2   CHLORIDE mmol/L 97* 97* 98 95*   CO2 mmol/L 20.0* 26.9 25.0 24.6   BUN mg/dL 4* 6 7 7   CREATININE mg/dL 0.71 0.80 0.78 0.96   GLUCOSE mg/dL 81 83 70 100*   EGFR mL/min/1.73 102.5 88.8 91.5 71.3     Results from last 7 days   Lab Units 11/26/24  1042   ALBUMIN g/dL 4.1   BILIRUBIN mg/dL 0.4   ALK PHOS U/L 130*   AST (SGOT) U/L 21   ALT (SGPT) U/L 13     Results from last 7 days   Lab Units 11/29/24  0256 11/28/24  0332 11/27/24  0443 11/26/24  1042   CALCIUM mg/dL 9.6 9.7 9.6 10.3   ALBUMIN g/dL  --   --   --  4.1   MAGNESIUM mg/dL  --   --  1.9  --        Results from last 7 days   Lab  Units 11/27/24  0443 11/26/24  1747 11/26/24  1233 11/26/24  1042   HSTROP T ng/L 8 16* 21* 6   D DIMER QUANT MCGFEU/mL  --   --   --  <0.27       Results from last 7 days   Lab Units 11/27/24  0443   CHOLESTEROL mg/dL 198   TRIGLYCERIDES mg/dL 57   HDL CHOL mg/dL 60   LDL CHOL mg/dL 127*             Test Results Pending at Discharge     Pending Results       None              Discharge Details        Discharge Medications        New Medications        Instructions Start Date   ivabradine HCl 5 MG tablet tablet  Commonly known as: CORLANOR   5 mg, Oral, 2 Times Daily With Meals             Continue These Medications        Instructions Start Date   albuterol sulfate  (90 Base) MCG/ACT inhaler  Commonly known as: PROVENTIL HFA;VENTOLIN HFA;PROAIR HFA   INHALE 1 TO 2 PUFFS BY MOUTH EVERY 4 TO 6 HOURS AS NEEDED FOR SHORTNESS OF BREATH OR WHEEZING      Brezi Aerosphere 160-9-4.8 MCG/ACT aerosol inhaler  Generic drug: Budeson-Glycopyrrol-Formoterol   2 puffs, 2 Times Daily      cholecalciferol 1.25 MG (61866 UT) capsule  Commonly known as: VITAMIN D3   50,000 Units, Every 7 Days      clotrimazole-betamethasone 1-0.05 % cream  Commonly known as: LOTRISONE       dicyclomine 20 MG tablet  Commonly known as: BENTYL   20 mg, Oral, Every 6 Hours PRN      magnesium oxide 400 MG tablet  Commonly known as: MAG-OX   magnesium oxide 400 mg (241.3 mg magnesium) tablet   TAKE 2 TABLETS BY MOUTH EVERY DAY AT BEDTIME FOR 30 DAYS      methocarbamol 500 MG tablet  Commonly known as: ROBAXIN   500 mg, Oral, 3 Times Daily      ondansetron ODT 4 MG disintegrating tablet  Commonly known as: ZOFRAN-ODT   4-8 mg, Oral, Every 8 Hours PRN      pantoprazole 40 MG EC tablet  Commonly known as: PROTONIX   40 mg, Oral, Daily      predniSONE 10 MG tablet  Commonly known as: DELTASONE   10 mg, Titrated             Stop These Medications      hydrOXYzine 50 MG tablet  Commonly known as: ATARAX              Allergies   Allergen Reactions   •  Clomipramine Diarrhea   • Doxycycline Nausea Only   • Flagyl [Metronidazole] Nausea Only   • Tramadol Other (See Comments)     Causes breathing issues. Increases sleep apnea episodes   • Erythromycin Nausea Only     Severe nausea   • Sulfamethoxazole-Trimethoprim Nausea And Vomiting   • Wound Dressing Adhesive Rash       Discharge Disposition:  Home or Self Care      Discharge Diet:  Diet Order   Procedures   • Diet: Regular/House, Vegetarian; Pescatarian (Allows fish, dairy, eggs); Fluid Consistency: Thin (IDDSI 0)       Discharge Activity:   Activity Instructions       Activity as Tolerated              CODE STATUS:    Code Status and Medical Interventions: CPR (Attempt to Resuscitate); Full Support   Ordered at: 11/26/24 1842     Level Of Support Discussed With:    Patient     Code Status (Patient has no pulse and is not breathing):    CPR (Attempt to Resuscitate)     Medical Interventions (Patient has pulse or is breathing):    Full Support       Future Appointments   Date Time Provider Department Center   12/4/2024 12:20 PM Portillo Verdugo MD [facility]   12/10/2024  9:00 AM Radha Browne APRN [facility]     Additional Instructions for the Follow-ups that You Need to Schedule       Discharge Follow-up with PCP   As directed       Currently Documented PCP:    Nina Hsieh APRN    PCP Phone Number:    [phone]     Follow Up Details: 1 week        Discharge Follow-up with Specified Provider: ; 2 Weeks   As directed      To:    Follow Up: 2 Weeks        Discharge Follow-up with Specified Provider: ; 1 Month   As directed      To:    Follow Up: 1 Month               Follow-up Information       Nina Hsieh APRN Follow up in 1 week(s).    Specialty: Family Medicine  Contact information:  [address] [place] [address]  [phone]               Portillo Verdugo MD. Go on 12/4/2024.    Specialty: Cardiology  Contact  information:  [address] [facility] [address]  [facility] [id number] [phone]               Nina Hsieh APRN .    Specialty: Family Medicine  Why: 1 week  Contact information:  [address] [id number] [phone]                             Additional Instructions for the Follow-ups that You Need to Schedule       Discharge Follow-up with PCP   As directed       Currently Documented PCP:    Nina Hsieh APRN    PCP Phone Number:    [phone]     Follow Up Details: 1 week        Discharge Follow-up with Specified Provider: ; 2 Weeks   As directed      To:    Follow Up: 2 Weeks        Discharge Follow-up with Specified Provider: ; 1 Month   As directed      To:    Follow Up: 1 Month            Time Spent on Discharge:  Greater than 30 minutes      Albert Lawton MD  [facility]  11/29/24  15:37 EST

## 2024-11-29 NOTE — ANESTHESIA POSTPROCEDURE EVALUATION
Patient: Kaylie VERDUZCO [place]    Procedure Summary       Date: 11/29/24 Room / Location:  [facility]    Anesthesia Start: 1135 Anesthesia Stop: 1206    Procedures:       ESOPHAGOGASTRODUODENOSCOPY with cold focep biopsies (Esophagus)      COLONOSCOPY to cecum with cold biopsy polypectomy Diagnosis:     Surgeons: Julian Rene MD Provider: Ron Fraser MD    Anesthesia Type: MAC ASA Status: 3            Anesthesia Type: MAC    Vitals  Vitals Value Taken Time   /80 11/29/24 1226   Temp     Pulse 83 11/29/24 1235   Resp 16 11/29/24 1224   SpO2 100 % 11/29/24 1235   Vitals shown include unfiled device data.        Anesthesia Post Evaluation

## 2024-11-29 NOTE — CASE MANAGEMENT/SOCIAL WORK
Discharge Planning Assessment  [facility]     Patient Name: Kaylie Perez  MRN: [id number]  Today's Date: 11/29/2024    Admit Date: 11/26/2024    Plan: home; follow for HH needs   Discharge Needs Assessment       Row Name 11/29/24 1037       Living Environment    People in Home alone    Current Living Arrangements home    Potentially Unsafe Housing Conditions none    In the past 12 months has the electric, gas, oil, or water company threatened to shut off services in your home? No    Primary Care Provided by self    Provides Primary Care For no one    Family Caregiver if Needed child(marcy), adult    Quality of Family Relationships helpful;involved    Able to Return to Prior Arrangements yes       Resource/Environmental Concerns    Resource/Environmental Concerns none       Transition Planning    Patient/Family Anticipates Transition to home    Patient/Family Anticipated Services at Transition none    Transportation Anticipated family or friend will provide       Discharge Needs Assessment    Readmission Within the Last 30 Days no previous admission in last 30 days    Equipment Currently Used at Home ramp    Equipment Needed After Discharge none                   Discharge Plan       Row Name 11/29/24 1039       Plan    Plan home; follow for HH needs    Patient/Family in Agreement with Plan yes    Plan Comments Spoke with pt bedside. Confirmed face sheet correct. Explained role of CCP. Pt's adult daughter Marge bedside. Pt reports she lives alone but daughter lives close by. She has ramp to enter her house in the back. She doesn't use any DME to ambulate and still drives. Her PCP is CHIQUIS Young and agreeable to meds to beds at d/c. She request shower chair, informed pt Medicaid won't pay for shower chair, advised to go to [employer] or drug store to purchase. Pt reports she may need HH at d/c, CCP will follow for possible HH need.                  Continued Care and Services - Admitted Since 11/26/2024     No active coordination exists for this encounter.          Demographic Summary    No documentation.                  Functional Status    No documentation.                  Psychosocial    No documentation.                  Abuse/Neglect    No documentation.                  Legal    No documentation.                  Substance Abuse    No documentation.                  Patient Forms    No documentation.                     JOSE Adams

## 2024-11-29 NOTE — ANESTHESIA PREPROCEDURE EVALUATION
Anesthesia Evaluation     Patient summary reviewed and Nursing notes reviewed   NPO Solid Status: > 6 hours  NPO Liquid Status: > 2 hours           Airway   Mallampati: II  TM distance: >3 FB  Neck ROM: full  Dental - normal exam     Pulmonary    (+) asthma,sleep apnea on CPAP  (-) COPD, not a smoker  Cardiovascular   Exercise tolerance: good (4-7 METS)    ECG reviewed    (-) past MI, dysrhythmias, angina    ROS comment: Atypical chest pain. Cardiology following. Normal echo this admission.     Neuro/Psych  (-) seizures, CVA  GI/Hepatic/Renal/Endo    (+) GERD  (-) liver disease, no renal disease, diabetes, no thyroid disorder    Musculoskeletal     Abdominal    Substance History      OB/GYN          Other                          Anesthesia Plan    ASA 3     MAC       Anesthetic plan, risks, benefits, and alternatives have been provided, discussed and informed consent has been obtained with: patient.        CODE STATUS:    Level Of Support Discussed With: Patient  Code Status (Patient has no pulse and is not breathing): CPR (Attempt to Resuscitate)  Medical Interventions (Patient has pulse or is breathing): Full Support

## 2024-11-29 NOTE — PLAN OF CARE
Goal Outcome Evaluation:  Plan of Care Reviewed With: patient        Progress: no change  Outcome Evaluation: Pt started on bowel prep for EGD & Colonoscopy. Pt NPO after midnight, Bentyl not administered overnight. Will continue to monitor

## 2024-11-30 ENCOUNTER — TELEPHONE (OUTPATIENT)
Dept: CARDIOLOGY | Facility: CLINIC | Age: 54
End: 2024-11-30
Payer: MEDICAID

## 2024-11-30 ENCOUNTER — READMISSION MANAGEMENT (OUTPATIENT)
Dept: CALL CENTER | Facility: HOSPITAL | Age: 54
End: 2024-11-30
Payer: MEDICAID

## 2024-11-30 NOTE — TELEPHONE ENCOUNTER
11/29/2024        I am the on-call provider this evening.  Patient called answering service while still inpatient stating that Corlanor is going to require a PA.  I informed patient that I am unable to do a PA on a Friday evening.  Will let her primary cardiology team know upon the return on Monday.    CHIQUIS Bo  [facility]

## 2024-11-30 NOTE — OUTREACH NOTE
Prep Survey      Flowsheet Row Responses   [facility] facility patient discharged from? [place]   Is LACE score < 7 ? No   Eligibility Readm Mgmt   Discharge diagnosis Dyspnea  [EGD and Colonoscopy this visit.]   Does the patient have one of the following disease processes/diagnoses(primary or secondary)? Other   Does the patient have Home health ordered? No   Is there a DME ordered? No   Medication alerts for this patient see AVS   Prep survey completed? Yes            Cassy ORELLANA - Registered Nurse

## 2024-12-02 LAB
CYTO UR: NORMAL
LAB AP CASE REPORT: NORMAL
PATH REPORT.FINAL DX SPEC: NORMAL
PATH REPORT.GROSS SPEC: NORMAL

## 2024-12-04 ENCOUNTER — READMISSION MANAGEMENT (OUTPATIENT)
Dept: CALL CENTER | Facility: HOSPITAL | Age: 54
End: 2024-12-04
Payer: MEDICAID

## 2024-12-04 ENCOUNTER — OFFICE VISIT (OUTPATIENT)
Dept: CARDIOLOGY | Facility: CLINIC | Age: 54
End: 2024-12-04
Payer: MEDICAID

## 2024-12-04 ENCOUNTER — TELEPHONE (OUTPATIENT)
Dept: GASTROENTEROLOGY | Facility: CLINIC | Age: 54
End: 2024-12-04
Payer: MEDICAID

## 2024-12-04 VITALS
BODY MASS INDEX: 29 KG/M2 | SYSTOLIC BLOOD PRESSURE: 116 MMHG | HEIGHT: 61 IN | OXYGEN SATURATION: 100 % | DIASTOLIC BLOOD PRESSURE: 76 MMHG | WEIGHT: 153.6 LBS

## 2024-12-04 DIAGNOSIS — G90.9 AUTONOMIC DYSFUNCTION: ICD-10-CM

## 2024-12-04 DIAGNOSIS — R14.0 ABDOMINAL BLOATING: ICD-10-CM

## 2024-12-04 DIAGNOSIS — R10.13 EPIGASTRIC ABDOMINAL PAIN: Primary | ICD-10-CM

## 2024-12-04 DIAGNOSIS — R63.4 WEIGHT LOSS: ICD-10-CM

## 2024-12-04 DIAGNOSIS — U09.9 LONG COVID: Primary | ICD-10-CM

## 2024-12-04 PROCEDURE — 93000 ELECTROCARDIOGRAM COMPLETE: CPT | Performed by: INTERNAL MEDICINE

## 2024-12-04 PROCEDURE — 99214 OFFICE O/P EST MOD 30 MIN: CPT | Performed by: INTERNAL MEDICINE

## 2024-12-04 NOTE — TELEPHONE ENCOUNTER
Returned patient's call and discussed results. Refer to Result notes for additional information. lb

## 2024-12-04 NOTE — PROGRESS NOTES
Date of Office Visit: 24  Encounter Provider: Portillo Verdugo MD  Place of Service: [facility] CARDIOLOGY  Patient Name: Kaylie Perez  :1970  [id number]    Chief Complaint   Patient presents with    Follow-up     Patient is in the office today for her hospital follow up appointment         HPI: Kaylie Perez is a 54 y.o. female she is here for follow-up I saw her in the hospital last week she has had her third episode of COVID each time she does she is having more and more symptoms that persist for longer but they usually go away is a former  .  Does not smoke does not have diabetes does not have hypertension she had some inflammatory markers her CRP and her sed rate were mildly elevated in the hospital.  She has really autonomic dysfunction and she may have POTS syndrome.  We did not do a tilt table test on her her echo was normal and we have put her on Corlanor because she does not have any blood pressure    She is better her heart rate is under 100 she feels a little bit better but she still feels like she is in general is inflamed but the good news is been each time she has had this she is gotten better from it.  No PND no orthopnea no edema no syncope no palpitations    Past Medical History:   Diagnosis Date    Asthma     Chronic fatigue     Chronic pain     COVID-19 2023    Diverticulosis     GERD (gastroesophageal reflux disease)     BECKIE (obstructive sleep apnea)        Past Surgical History:   Procedure Laterality Date    CARPAL TUNNEL RELEASE Bilateral      SECTION      COLONOSCOPY      COLONOSCOPY N/A 2024    Procedure: COLONOSCOPY to cecum with cold biopsy polypectomy;  Surgeon: Julian Rene MD;  Location: [facility] [place] ENDOSCOPY;  Service: Gastroenterology;  Laterality: N/A;  pre: abdominal pain, wt loss, bloating  post: diverticulosis, hemorrhoids, polyp    ENDOMETRIAL ABLATION Right     ENDOSCOPY N/A 2024     Procedure: ESOPHAGOGASTRODUODENOSCOPY with cold focep biopsies;  Surgeon: Julian Rene MD;  Location: [facility] ENDOSCOPY;  Service: Gastroenterology;  Laterality: N/A;  pre: abdominal pain, wt loss, bloating  post: duodenal lipoma       Social History     Socioeconomic History    Marital status:    Tobacco Use    Smoking status: Never     Passive exposure: Never    Smokeless tobacco: Never   Vaping Use    Vaping status: Never Used   Substance and Sexual Activity    Alcohol use: Yes     Comment: Socially    Drug use: Yes     Types: Marijuana    Sexual activity: Defer       No family history on file.    Review of Systems   Constitutional: Negative for decreased appetite, fever, malaise/fatigue and weight loss.   HENT:  Negative for nosebleeds.    Eyes:  Negative for double vision.   Cardiovascular:  Negative for chest pain, claudication, cyanosis, dyspnea on exertion, irregular heartbeat, leg swelling, near-syncope, orthopnea, palpitations, paroxysmal nocturnal dyspnea and syncope.   Respiratory:  Negative for cough, hemoptysis and shortness of breath.    Hematologic/Lymphatic: Negative for bleeding problem.   Skin:  Negative for rash.   Musculoskeletal:  Negative for falls and myalgias.   Gastrointestinal:  Negative for hematochezia, jaundice, melena, nausea and vomiting.   Genitourinary:  Negative for hematuria.   Neurological:  Negative for dizziness and seizures.   Psychiatric/Behavioral:  Negative for altered mental status and memory loss.        Allergies   Allergen Reactions    Clomipramine Diarrhea    Doxycycline Nausea Only    Flagyl [Metronidazole] Nausea Only    Tramadol Other (See Comments)     Causes breathing issues. Increases sleep apnea episodes    Erythromycin Nausea Only     Severe nausea    Sulfamethoxazole-Trimethoprim Nausea And Vomiting    Wound Dressing Adhesive Rash         Current Outpatient Medications:     albuterol sulfate  (90 Base) MCG/ACT inhaler, INHALE 1 TO 2 PUFFS  "BY MOUTH EVERY 4 TO 6 HOURS AS NEEDED FOR SHORTNESS OF BREATH OR WHEEZING, Disp: , Rfl:     Breztri Aerosphere 160-9-4.8 MCG/ACT aerosol inhaler, 2 puffs 2 (Two) Times a Day., Disp: , Rfl:     cholecalciferol (VITAMIN D3) 1.25 MG (98726 UT) capsule, Take 1 capsule by mouth Every 7 (Seven) Days., Disp: , Rfl:     clotrimazole-betamethasone (LOTRISONE) 1-0.05 % cream, , Disp: , Rfl:     dicyclomine (BENTYL) 20 MG tablet, Take 1 tablet by mouth Every 6 (Six) Hours As Needed for Abdominal Cramping., Disp: 12 tablet, Rfl: 0    ivabradine HCl (CORLANOR) 5 MG tablet tablet, Take 1 tablet by mouth 2 (Two) Times a Day With Meals for 30 days., Disp: 60 tablet, Rfl: 0    magnesium oxide (MAG-OX) 400 MG tablet, magnesium oxide 400 mg (241.3 mg magnesium) tablet  TAKE 2 TABLETS BY MOUTH EVERY DAY AT BEDTIME FOR 30 DAYS, Disp: , Rfl:     methocarbamol (ROBAXIN) 500 MG tablet, Take 1 tablet by mouth 3 (Three) Times a Day., Disp: 21 tablet, Rfl: 0    ondansetron ODT (ZOFRAN-ODT) 4 MG disintegrating tablet, Take 1-2 tablets by mouth Every 8 (Eight) Hours As Needed for Nausea or Vomiting., Disp: 15 tablet, Rfl: 0    pantoprazole (PROTONIX) 40 MG EC tablet, Take 1 tablet by mouth Daily., Disp: 30 tablet, Rfl: 0    predniSONE (DELTASONE) 10 MG tablet, Take 1 tablet by mouth Dose Adjusted By Provider As Needed. Take 4 tablets by mouth daily for 7 days, THEN 3 tablets daily for 7 days, THEN 2 tablets daily for 7 days, THEN 1 tablet daily for 7 days., Disp: , Rfl:       Objective:     Vitals:    12/04/24 1123   BP: 116/76   BP Location: Left arm   Patient Position: Sitting   Cuff Size: Adult   SpO2: 100%   Weight: 69.7 kg (153 lb 9.6 oz)   Height: 154.9 cm (61\")     Body mass index is 29.02 kg/m².    Constitutional:       Appearance: Well-developed.   Eyes:      General: No scleral icterus.  HENT:      Head: Normocephalic.   Neck:      Thyroid: No thyromegaly.      Vascular: No JVD.      Lymphadenopathy: No cervical adenopathy. "   Pulmonary:      Effort: Pulmonary effort is normal.      Breath sounds: Normal breath sounds. No wheezing. No rales.   Cardiovascular:      Normal rate. Regular rhythm.      No gallop.    Edema:     Peripheral edema absent.   Abdominal:      Palpations: Abdomen is soft.      Tenderness: There is no abdominal tenderness.   Musculoskeletal: Normal range of motion. Skin:     General: Skin is warm and dry.      Findings: No rash.   Neurological:      Mental Status: Alert and oriented to person, place, and time.           ECG 12 Lead    Date/Time: 12/4/2024 12:03 PM  Performed by: Portillo Verdugo MD    Authorized by: Portillo Verdugo MD         Not performed  Assessment:       Diagnosis Plan   1. Long COVID        2. Autonomic dysfunction               Plan:       Okay to stick with the Corlanor for her I think she will get better and it will eventually go away but here early on she is a little ill from it we will going to start trying to do a little bit of exercise by walking and you know if we keep having problems we may refer her to the [facility] at least for a video visit    Return in about 2 months (around 2/4/2025).     As always, it has been a pleasure to participate in your patient's care.      Sincerely,       Portillo Verdugo MD

## 2024-12-04 NOTE — OUTREACH NOTE
Medical Week 1 Survey      Flowsheet Row Responses   [facility] patient discharged from? [place]   Does the patient have one of the following disease processes/diagnoses(primary or secondary)? Other   Week 1 attempt successful? Yes   Call start time 1437   Call end time 1441   Discharge diagnosis Dyspnea   Person spoke with today (if not patient) and relationship patient   Meds reviewed with patient/caregiver? Yes   Does the patient have all medications ordered at discharge? Yes   Is the patient taking all medications as directed (includes completed medication regime)? Yes   Does the patient have a primary care provider?  Yes   Comments regarding PCP Seen cards and pcp today   Has home health visited the patient within 72 hours of discharge? N/A   Psychosocial issues? No   Did the patient receive a copy of their discharge instructions? Yes   Nursing interventions Reviewed instructions with patient   What is the patient's perception of their health status since discharge? Improving   Is the patient/caregiver able to teach back signs and symptoms related to disease process for when to call PCP? Yes   Is the patient/caregiver able to teach back signs and symptoms related to disease process for when to call 911? Yes   Is the patient/caregiver able to teach back the hierarchy of who to call/visit for symptoms/problems? PCP, Specialist, Home health nurse, Urgent Care, ED, 911 Yes   Week 1 call completed? Yes   Graduated Yes   Wrap up additional comments Patient reports doing well no concerns or questions noted.   Call end time 1441            Lore [place] - Registered Nurse

## 2024-12-10 ENCOUNTER — OFFICE VISIT (OUTPATIENT)
Dept: GASTROENTEROLOGY | Facility: CLINIC | Age: 54
End: 2024-12-10
Payer: MEDICAID

## 2024-12-10 ENCOUNTER — LAB (OUTPATIENT)
Dept: LAB | Facility: HOSPITAL | Age: 54
End: 2024-12-10
Payer: MEDICAID

## 2024-12-10 VITALS
HEART RATE: 84 BPM | OXYGEN SATURATION: 100 % | WEIGHT: 151.9 LBS | HEIGHT: 61 IN | DIASTOLIC BLOOD PRESSURE: 68 MMHG | SYSTOLIC BLOOD PRESSURE: 108 MMHG | BODY MASS INDEX: 28.68 KG/M2 | TEMPERATURE: 96.4 F

## 2024-12-10 DIAGNOSIS — R11.0 NAUSEA: ICD-10-CM

## 2024-12-10 DIAGNOSIS — R74.8 ELEVATED ALKALINE PHOSPHATASE LEVEL: ICD-10-CM

## 2024-12-10 DIAGNOSIS — R19.8 ABDOMINAL FULLNESS: Primary | ICD-10-CM

## 2024-12-10 DIAGNOSIS — R10.32 LEFT LOWER QUADRANT ABDOMINAL PAIN: ICD-10-CM

## 2024-12-10 DIAGNOSIS — D17.5 LIPOMA OF SMALL INTESTINE: ICD-10-CM

## 2024-12-10 LAB — GGT SERPL-CCNC: 12 U/L (ref 5–36)

## 2024-12-10 PROCEDURE — 82977 ASSAY OF GGT: CPT | Performed by: NURSE PRACTITIONER

## 2024-12-10 PROCEDURE — 36415 COLL VENOUS BLD VENIPUNCTURE: CPT | Performed by: NURSE PRACTITIONER

## 2024-12-10 PROCEDURE — 84075 ASSAY ALKALINE PHOSPHATASE: CPT | Performed by: NURSE PRACTITIONER

## 2024-12-10 PROCEDURE — 86381 MITOCHONDRIAL ANTIBODY EACH: CPT | Performed by: NURSE PRACTITIONER

## 2024-12-10 PROCEDURE — 84080 ASSAY ALKALINE PHOSPHATASES: CPT | Performed by: NURSE PRACTITIONER

## 2024-12-10 PROCEDURE — 99214 OFFICE O/P EST MOD 30 MIN: CPT | Performed by: NURSE PRACTITIONER

## 2024-12-10 NOTE — PATIENT INSTRUCTIONS
Schedule upper GI xray for further evaluation.    Schedule gastric emptying study to assess for gastroparesis.     Check lab work to evaluate elevated alkaline phosphatase.    For constipation, recommend starting MiraLAX daily available over-the-counter.  Mix 1 capful in 8 ounces of noncarbonated liquid and drink once daily.     For dietitian services:  Claire Caballero RD  Address: [address] , [place] [address]  Phone: [phone]  Website: [email or web address]    or    Staci Villafuerte RD  Address: [address] [place] [address] [address], [place] [address]  Phone: [phone]

## 2024-12-10 NOTE — PROGRESS NOTES
"Chief Complaint   Patient presents with    GI Problem         History of Present Illness  Patient is a 54-year-old female who presents today for follow-up.  She was hospitalized in November for chest pain and abdominal discomfort.  GI was consulted and she had an EGD and colonoscopy.  EGD with lipoma in the duodenum, otherwise normal.  Colonoscopy with internal hemorrhoids, diverticulosis, and 1 polyp.  She was recommended to have a gastric emptying study which has not yet been completed.    Patient presents today for evaluation with concerns about GI symptoms that started in August.  She was prescribed clomipramine to help with sleep and after taking this developed nausea, vomiting, and diarrhea and was unable to eat.  She stopped taking it and the symptoms improved somewhat but since that time she has had persistent fullness, early satiety, and nausea.  She has had no further vomiting.  She has been unable to eat as much and has lost around 22 pounds.  She has had abdominal pain present to the left lower quadrant that comes and goes.  She has felt more constipated, she is used to having 3-4 bowel movements per day but more recently has only been having 1 bowel movement per day and does not feel she is emptying completely.    She has a history of long COVID reports significant fatigue associated with this.    She is also noted to have a chronically elevated alkaline phosphatase level.  Reports this has dated back to her 20s.  Because of this has not previously been identified.     Result Review :       Tissue Pathology Exam (11/29/2024 11:47)    CT Abdomen Pelvis With Contrast (11/04/2024 19:15)    Upper GI Endoscopy (11/29/2024 11:36)    Colonoscopy (11/29/2024 11:32)    ED to Hosp-Admission (Discharged) with Albert Lawton MD; Clifton Panda MD (11/26/2024)    Vital Signs:   /68   Pulse 84   Temp 96.4 °F (35.8 °C)   Ht 154.9 cm (61\")   Wt 68.9 kg (151 lb 14.4 oz)   SpO2 100%   BMI 28.70 kg/m² "     Body mass index is 28.7 kg/m².     Physical Exam  Vitals reviewed.   Constitutional:       General: She is not in acute distress.     Appearance: She is well-developed.   HENT:      Head: Normocephalic and atraumatic.   Pulmonary:      Effort: Pulmonary effort is normal. No respiratory distress.   Abdominal:      General: Abdomen is flat. Bowel sounds are normal. There is no distension.      Palpations: Abdomen is soft.      Tenderness: There is no abdominal tenderness.   Skin:     General: Skin is dry.      Coloration: Skin is not pale.   Neurological:      Mental Status: She is alert and oriented to person, place, and time.   Psychiatric:         Thought Content: Thought content normal.           Assessment and Plan    Diagnoses and all orders for this visit:    1. Abdominal fullness (Primary)  -     FL Upper GI Double Contrast; Future    2. Elevated alkaline phosphatase level  -     Gamma GT  -     Mitochondrial Antibodies, M2  -     Alkaline Phosphatase, Isoenzymes    3. Lipoma of small intestine    4. Nausea  -     FL Upper GI Double Contrast; Future    5. Left lower quadrant abdominal pain         Discussion  Patient presents today for evaluation and follow-up after recent hospitalization with concerns about persistent abdominal fullness, abdominal pain, and nausea.    Discussed concern for gastroparesis and agree with proceeding with gastric emptying study which was recommended after recent hospitalization.  Order has previously been placed.    We will also schedule an upper GI x-ray for further evaluation due to lipoma and duodenum to evaluate for any evidence of partial obstruction or gastric outlet obstruction that this could be causing but discussed with her today feel this likely is not source of her symptoms.    Regarding elevated alkaline phosphatase level, we will check mitochondrial antibody to screen for primary biliary cholangitis and fractionate alkaline phosphatase level.  Due to chronicity  and otherwise normal liver enzymes, this may be her baseline but if elevation persists or worsens could consider MRCP.    Regarding left lower quadrant pain, feel this is likely secondary to constipation.  Recommended starting MiraLAX to help promote more regular and complete bowel movements and see if this would lead to improvement.    She also has questions about dietitian services and contact information was provided today.          Follow Up   Return for Follow up to review results after testing complete.    Patient Instructions   Schedule upper GI xray for further evaluation.    Schedule gastric emptying study to assess for gastroparesis.     Check lab work to evaluate elevated alkaline phosphatase.    For constipation, recommend starting MiraLAX daily available over-the-counter.  Mix 1 capful in 8 ounces of noncarbonated liquid and drink once daily.     For dietitian services:  Claire Caballero RD  Address: [address] , [place] [address]  Phone: [phone]  Website: [email or web address]    or    Staci Villafuerte RD  Address: [address] [place] [address] [address], [place] [id number]  Phone: [phone]

## 2024-12-11 LAB — MITOCHONDRIA M2 IGG SER-ACNC: <20 UNITS (ref 0–20)

## 2024-12-12 LAB
ALP BONE CFR SERPL: 26 % (ref 14–68)
ALP INTEST CFR SERPL: 3 % (ref 0–18)
ALP LIVER CFR SERPL: 71 % (ref 18–85)
ALP SERPL-CCNC: 115 IU/L (ref 44–121)

## 2024-12-23 RX ORDER — IVABRADINE 5 MG/1
2.5 TABLET, FILM COATED ORAL 2 TIMES DAILY WITH MEALS
Start: 2024-12-23

## 2025-01-17 RX ORDER — IVABRADINE 5 MG/1
2.5 TABLET, FILM COATED ORAL 2 TIMES DAILY WITH MEALS
Qty: 60 TABLET
Start: 2025-01-17 | End: 2025-01-17 | Stop reason: SDUPTHER

## 2025-01-17 RX ORDER — IVABRADINE 5 MG/1
2.5 TABLET, FILM COATED ORAL 2 TIMES DAILY WITH MEALS
Qty: 30 TABLET | Refills: 6 | Status: SHIPPED | OUTPATIENT
Start: 2025-01-17

## 2025-01-21 ENCOUNTER — HOSPITAL ENCOUNTER (OUTPATIENT)
Dept: NUCLEAR MEDICINE | Facility: HOSPITAL | Age: 55
Discharge: HOME | End: 2025-01-21
Payer: MEDICAID

## 2025-01-21 DIAGNOSIS — R63.4 WEIGHT LOSS: ICD-10-CM

## 2025-01-21 DIAGNOSIS — R10.13 EPIGASTRIC ABDOMINAL PAIN: ICD-10-CM

## 2025-01-21 DIAGNOSIS — R14.0 ABDOMINAL BLOATING: ICD-10-CM

## 2025-01-21 PROCEDURE — A9541 TC99M SULFUR COLLOID: HCPCS | Performed by: INTERNAL MEDICINE

## 2025-01-21 PROCEDURE — 78264 GASTRIC EMPTYING IMG STUDY: CPT

## 2025-01-21 PROCEDURE — 34310000005 TECHNETIUM SULFUR COLLOID: Performed by: INTERNAL MEDICINE

## 2025-01-21 RX ADMIN — TECHNETIUM TC 99M SULFUR COLLOID 1 DOSE: KIT at 07:56

## 2025-02-12 ENCOUNTER — TELEMEDICINE (OUTPATIENT)
Dept: GASTROENTEROLOGY | Facility: CLINIC | Age: 55
End: 2025-02-12
Payer: MEDICAID

## 2025-02-12 ENCOUNTER — TELEPHONE (OUTPATIENT)
Dept: GASTROENTEROLOGY | Facility: CLINIC | Age: 55
End: 2025-02-12

## 2025-02-12 DIAGNOSIS — R63.4 WEIGHT LOSS: ICD-10-CM

## 2025-02-12 DIAGNOSIS — R10.84 GENERALIZED ABDOMINAL PAIN: Primary | ICD-10-CM

## 2025-02-12 DIAGNOSIS — K59.00 CONSTIPATION, UNSPECIFIED CONSTIPATION TYPE: ICD-10-CM

## 2025-02-12 PROCEDURE — 99214 OFFICE O/P EST MOD 30 MIN: CPT | Performed by: NURSE PRACTITIONER

## 2025-02-12 NOTE — PROGRESS NOTES
Chief Complaint   Patient presents with    Abdominal Pain     Mode of Visit: Video  Location of patient: -HOME-  Location of provider: [facility]  You have chosen to receive care through a telehealth visit.  The patient has signed the video visit consent form.  The visit included audio and video interaction. No technical issues occurred during this visit.        History of Present Illness  Patient is a 54-year-old female who presents today for follow-up.   She was seen in the office December 2024 with concerns about abdominal fullness, nausea, and abdominal pain.  She had previously had a lipoma of the duodenum identified for which upper GI x-ray was recommended to evaluate for any evidence of gastric outlet obstruction.  This was not completed.  She contacted the office yesterday with concerns about constipation and was worked in for an appointment.    Patient presents today for follow-up.  Reports she has been experiencing ongoing symptoms since last office visit.  She has been experiencing abdominal pain on a regular basis that is present to the mid abdomen and right side of her abdomen.  She has had difficulty eating due to the symptoms and has not been eating much and has had continued issues with weight loss, down to 139 pounds from 173 pounds in October.    She also notes a rash to her navel and reports skin irritation in this area.    She primarily follows a vegetarian diet as eating meat causes constipation and makes her feel worse.    She has had some issues with constipation but reports she generally is passing a bowel movement on a daily basis.  Reports at times she passes a small amount and does not feel that she empties completely.  She reports as long as she avoids meat she is generally able to have a bowel movement.    She is concerned about nutritional deficiencies.    Symptoms seemed to start after she had COVID in July 2024.    She is concerned lipoma is contributing to constipation issues.         Physical Exam  Constitutional:       General: She is not in acute distress.     Appearance: Normal appearance. She is well-developed. She is not ill-appearing.   Pulmonary:      Effort: No respiratory distress.   Neurological:      Mental Status: She is alert and oriented to person, place, and time.             Result Review :      NM Gastric Emptying (01/21/2025 12:25)    Office Visit with Radha Browne APRN (12/10/2024)    Tissue Pathology Exam (11/29/2024 11:47)    CT Abdomen Pelvis With Contrast (11/04/2024 19:15)    Upper GI Endoscopy (11/29/2024 11:36)    Colonoscopy (11/29/2024 11:32)    Assessment and Plan    Diagnoses and all orders for this visit:    1. Generalized abdominal pain (Primary)  -     Breath Hydrogen Test; Future  -     Ambulatory Referral to Multi-Disciplinary Clinic    2. Weight loss  -     Ambulatory Referral to Nutrition Services  -     Ambulatory Referral to Multi-Disciplinary Clinic    3. Constipation, unspecified constipation type    Discussion  Patient presents today for follow-up.  She has been experiencing ongoing issues with abdominal pain, weight loss, and intermittent issues with constipation.    We discussed today that based off location of lipoma, this would not be contributing to her constipation as this was located in the duodenum.  It did not appear to be obstructing on recent EGD but due to ongoing symptoms and difficulty eating, recommend proceeding with upper GI x-ray to rule out any evidence of gastric outlet obstruction from this that could be contributing.    We also discussed recommendation to evaluate for small intestinal bacterial overgrowth to see if this could be contributing to her symptoms as this can cause malabsorption issues.    Prior EGD, colonoscopy, and CT scan with no source of symptoms identified.  She also had a gastric emptying study completed that showed rapid emptying at 1 hour, no evidence of gastroparesis.  We discussed that rapid gastric  emptying at times can contribute to abdominal discomfort and could consider increasing fiber and fat content in food to see if this would help slow digestion however due to current symptoms feel she may have difficulty tolerating this.    We will refer to dietitian for consultation and assistance with diet.  Also discussed recommendation for referral to long COVID clinic          Patient Instructions   Proceed with upper GI x-ray for further evaluation.    Schedule hydrogen breath test to assess for small intestinal bacterial overgrowth.     Refer to dietitian.    Refer to long COVID clinic.       EMR Dragon/Transcription Disclaimer:  This document has been dictated utilizing Dragon dictation.

## 2025-02-12 NOTE — TELEPHONE ENCOUNTER
Left detailed message for patient and stated this is forwarded to scheduling department to schedule xray.

## 2025-02-12 NOTE — PATIENT INSTRUCTIONS
Proceed with upper GI x-ray for further evaluation.    Schedule hydrogen breath test to assess for small intestinal bacterial overgrowth.     Refer to dietitian.    Refer to [facility] clinic.

## 2025-02-14 ENCOUNTER — PATIENT MESSAGE (OUTPATIENT)
Dept: GASTROENTEROLOGY | Facility: CLINIC | Age: 55
End: 2025-02-14
Payer: MEDICAID

## 2025-02-14 NOTE — TELEPHONE ENCOUNTER
Radha has ordered an upper GI x-ray and SIBO breath test.  I recommend completing these tests first.  Giving an antibiotic before the breath test can alter the results.

## 2025-03-04 ENCOUNTER — PATIENT MESSAGE (OUTPATIENT)
Dept: GASTROENTEROLOGY | Facility: CLINIC | Age: 55
End: 2025-03-04
Payer: MEDICAID

## 2025-03-05 RX ORDER — ESOMEPRAZOLE MAGNESIUM 40 MG/1
40 CAPSULE, DELAYED RELEASE ORAL
Qty: 30 CAPSULE | Refills: 5 | Status: SHIPPED | OUTPATIENT
Start: 2025-03-05

## 2025-03-05 RX ORDER — FAMOTIDINE 20 MG/1
20 TABLET, FILM COATED ORAL NIGHTLY PRN
Qty: 30 TABLET | Refills: 5 | Status: SHIPPED | OUTPATIENT
Start: 2025-03-05

## 2025-03-05 NOTE — TELEPHONE ENCOUNTER
Sent in prescription for esomeprazole that she can start in place of the pantoprazole in the morning and famotidine she can use in the evening.  Would recommend follow-up with primary care provider regarding rash.

## 2025-03-05 NOTE — TELEPHONE ENCOUNTER
RN has spoken with patient regarding the test. Order has  been faxed to [facility]. Pt is wondering if there is anything else she can take to help with her heartburn? Reports pantoprazole is not effective. FL Upper GI is scheduled for 3/12/2025. Please advise. EL

## 2025-05-09 ENCOUNTER — OFFICE VISIT (OUTPATIENT)
Dept: CARDIOLOGY | Age: 55
End: 2025-05-09
Payer: MEDICAID

## 2025-05-09 VITALS
DIASTOLIC BLOOD PRESSURE: 78 MMHG | HEIGHT: 61 IN | SYSTOLIC BLOOD PRESSURE: 126 MMHG | BODY MASS INDEX: 26.32 KG/M2 | HEART RATE: 84 BPM | WEIGHT: 139.4 LBS

## 2025-05-09 DIAGNOSIS — R00.2 PALPITATIONS: ICD-10-CM

## 2025-05-09 DIAGNOSIS — I42.0 CARDIOMYOPATHY, DILATED: Primary | ICD-10-CM

## 2025-05-09 DIAGNOSIS — R07.89 CHEST PAIN, ATYPICAL: ICD-10-CM

## 2025-05-09 DIAGNOSIS — R06.00 DYSPNEA, UNSPECIFIED TYPE: ICD-10-CM

## 2025-05-09 DIAGNOSIS — R00.0 TACHYCARDIA: ICD-10-CM

## 2025-05-09 PROCEDURE — 99214 OFFICE O/P EST MOD 30 MIN: CPT | Performed by: INTERNAL MEDICINE

## 2025-05-09 RX ORDER — ASPIRIN 81 MG/1
81 TABLET, CHEWABLE ORAL DAILY
COMMUNITY

## 2025-05-09 RX ORDER — DOXEPIN HYDROCHLORIDE 10 MG/1
10 CAPSULE ORAL 2 TIMES DAILY
COMMUNITY
Start: 2025-01-17

## 2025-05-09 RX ORDER — OXYCODONE HYDROCHLORIDE 5 MG/1
5 CAPSULE ORAL EVERY 6 HOURS PRN
COMMUNITY

## 2025-05-09 RX ORDER — ATORVASTATIN CALCIUM 40 MG/1
40 TABLET, FILM COATED ORAL DAILY
COMMUNITY

## 2025-05-09 RX ORDER — METOCLOPRAMIDE 10 MG/1
10 TABLET ORAL EVERY 6 HOURS PRN
COMMUNITY
Start: 2025-04-01

## 2025-05-09 RX ORDER — FLUOXETINE HYDROCHLORIDE 40 MG/1
40 CAPSULE ORAL DAILY
COMMUNITY
Start: 2025-04-04

## 2025-05-09 RX ORDER — POLYETHYLENE GLYCOL 3350
17 POWDER (GRAM) MISCELLANEOUS DAILY
COMMUNITY
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
## 2025-05-12 ENCOUNTER — TELEPHONE (OUTPATIENT)
Dept: CARDIOLOGY | Age: 55
End: 2025-05-12

## 2025-05-12 NOTE — TELEPHONE ENCOUNTER
"Caller: Kaylie Perez \"Moira\"    Relationship to patient: Self    Best call back number: [phone]    Patient is needing: PT WAS SEEN IN OFFICE ON FRIDAY. ECHO AND HOLTER WAS SCHEDULED FOR 05.16.2025. PT WAS IN ER 05.11.2025 WITH SOB. PT HAS PNEUMONIA AND FLUID AROUND HEART. ER REQUESTED THAT SHE RSD HER ECHO AND HOLTER FOR SOONER THAN FRIDAY. PLEASE CALL TO RSD AND ADVISE.          "

## 2025-05-16 ENCOUNTER — HOSPITAL ENCOUNTER (OUTPATIENT)
Dept: CARDIOLOGY | Facility: HOSPITAL | Age: 55
Discharge: HOME | End: 2025-05-16
Payer: MEDICAID

## 2025-05-16 VITALS
RESPIRATION RATE: 16 BRPM | OXYGEN SATURATION: 98 % | SYSTOLIC BLOOD PRESSURE: 117 MMHG | HEART RATE: 83 BPM | DIASTOLIC BLOOD PRESSURE: 78 MMHG

## 2025-05-16 VITALS — BODY MASS INDEX: 26.24 KG/M2 | HEIGHT: 61 IN | WEIGHT: 139 LBS

## 2025-05-16 DIAGNOSIS — R00.2 PALPITATIONS: ICD-10-CM

## 2025-05-16 DIAGNOSIS — R00.0 TACHYCARDIA: ICD-10-CM

## 2025-05-16 DIAGNOSIS — R06.00 DYSPNEA, UNSPECIFIED TYPE: ICD-10-CM

## 2025-05-16 DIAGNOSIS — I42.0 CARDIOMYOPATHY, DILATED: ICD-10-CM

## 2025-05-16 DIAGNOSIS — R00.2 PALPITATIONS: Primary | ICD-10-CM

## 2025-05-16 DIAGNOSIS — R07.89 CHEST PAIN, ATYPICAL: Primary | ICD-10-CM

## 2025-05-16 LAB
AORTIC ARCH: 1.9 CM
AORTIC DIMENSIONLESS INDEX: 0.63 (DI)
ASCENDING AORTA: 2.7 CM
AV MEAN PRESS GRAD SYS DOP V1V2: 4 MMHG
AV VMAX SYS DOP: 139 CM/SEC
BH CV ECHO MEAS - ACS: 1.61 CM
BH CV ECHO MEAS - AO MAX PG: 7.7 MMHG
BH CV ECHO MEAS - AO ROOT DIAM: 2.5 CM
BH CV ECHO MEAS - AO V2 VTI: 25.9 CM
BH CV ECHO MEAS - AVA(I,D): 1.85 CM2
BH CV ECHO MEAS - EDV(CUBED): 136 ML
BH CV ECHO MEAS - EDV(MOD-SP2): 130 ML
BH CV ECHO MEAS - EDV(MOD-SP4): 118 ML
BH CV ECHO MEAS - EF(MOD-SP2): 36.2 %
BH CV ECHO MEAS - EF(MOD-SP4): 38.1 %
BH CV ECHO MEAS - ESV(CUBED): 75 ML
BH CV ECHO MEAS - ESV(MOD-SP2): 83 ML
BH CV ECHO MEAS - ESV(MOD-SP4): 73 ML
BH CV ECHO MEAS - FS: 18 %
BH CV ECHO MEAS - IVS/LVPW: 1.19 CM
BH CV ECHO MEAS - IVSD: 0.74 CM
BH CV ECHO MEAS - LAT PEAK E' VEL: 9.1 CM/SEC
BH CV ECHO MEAS - LV DIASTOLIC VOL/BSA (35-75): 72.9 CM2
BH CV ECHO MEAS - LV MASS(C)D: 117.3 GRAMS
BH CV ECHO MEAS - LV MAX PG: 4 MMHG
BH CV ECHO MEAS - LV MEAN PG: 2 MMHG
BH CV ECHO MEAS - LV SYSTOLIC VOL/BSA (12-30): 45.1 CM2
BH CV ECHO MEAS - LV V1 MAX: 99.4 CM/SEC
BH CV ECHO MEAS - LV V1 VTI: 16.3 CM
BH CV ECHO MEAS - LVIDD: 5.1 CM
BH CV ECHO MEAS - LVIDS: 4.2 CM
BH CV ECHO MEAS - LVOT AREA: 2.9 CM2
BH CV ECHO MEAS - LVOT DIAM: 1.93 CM
BH CV ECHO MEAS - LVPWD: 0.63 CM
BH CV ECHO MEAS - MED PEAK E' VEL: 8.5 CM/SEC
BH CV ECHO MEAS - MV A DUR: 0.09 SEC
BH CV ECHO MEAS - MV A MAX VEL: 96.1 CM/SEC
BH CV ECHO MEAS - MV DEC SLOPE: 676.1 CM/SEC2
BH CV ECHO MEAS - MV DEC TIME: 0.15 SEC
BH CV ECHO MEAS - MV E MAX VEL: 104 CM/SEC
BH CV ECHO MEAS - MV E/A: 1.08
BH CV ECHO MEAS - MV MAX PG: 6.3 MMHG
BH CV ECHO MEAS - MV MEAN PG: 2.6 MMHG
BH CV ECHO MEAS - MV P1/2T: 57.5 MSEC
BH CV ECHO MEAS - MV V2 VTI: 24 CM
BH CV ECHO MEAS - MVA(P1/2T): 3.8 CM2
BH CV ECHO MEAS - MVA(VTI): 2 CM2
BH CV ECHO MEAS - PA ACC TIME: 0.16 SEC
BH CV ECHO MEAS - PA V2 MAX: 85.5 CM/SEC
BH CV ECHO MEAS - PULM A REVS DUR: 0.11 SEC
BH CV ECHO MEAS - PULM A REVS VEL: 28.9 CM/SEC
BH CV ECHO MEAS - PULM DIAS VEL: 38.6 CM/SEC
BH CV ECHO MEAS - PULM S/D: 1.25
BH CV ECHO MEAS - PULM SYS VEL: 48.2 CM/SEC
BH CV ECHO MEAS - QP/QS: 0.55
BH CV ECHO MEAS - RV MAX PG: 0.79 MMHG
BH CV ECHO MEAS - RV V1 MAX: 44.6 CM/SEC
BH CV ECHO MEAS - RV V1 VTI: 8 CM
BH CV ECHO MEAS - RVOT DIAM: 2.04 CM
BH CV ECHO MEAS - SUP REN AO DIAM: 1.7 CM
BH CV ECHO MEAS - SV(LVOT): 47.8 ML
BH CV ECHO MEAS - SV(MOD-SP2): 47 ML
BH CV ECHO MEAS - SV(MOD-SP4): 45 ML
BH CV ECHO MEAS - SV(RVOT): 26.4 ML
BH CV ECHO MEAS - SVI(LVOT): 29.6 ML/M2
BH CV ECHO MEAS - SVI(MOD-SP2): 29 ML/M2
BH CV ECHO MEAS - SVI(MOD-SP4): 27.8 ML/M2
BH CV ECHO MEAS - TAPSE (>1.6): 1.95 CM
BH CV ECHO MEAS - TR MAX PG: 14 MMHG
BH CV ECHO MEAS - TR MAX VEL: 187.3 CM/SEC
BH CV ECHO MEASUREMENTS AVERAGE E/E' RATIO: 11.82
BH CV XLRA - RV BASE: 2.8 CM
BH CV XLRA - RV LENGTH: 7.5 CM
BH CV XLRA - RV MID: 1.81 CM
BH CV XLRA - TDI S': 8.8 CM/SEC
CRP SERPL-MCNC: 8.58 MG/DL (ref 0–0.5)
DIGOXIN SERPL-MCNC: <0.3 NG/ML (ref 0.6–1.2)
ERYTHROCYTE [SEDIMENTATION RATE] IN BLOOD: 45 MM/HR (ref 0–30)
LEFT ATRIUM VOLUME INDEX: 18.9 ML/M2
LV EF BIPLANE MOD: 38 %
SINUS: 2.37 CM
STJ: 1.9 CM
TROPONIN T SERPL HS-MCNC: 14 NG/L

## 2025-05-16 PROCEDURE — 86140 C-REACTIVE PROTEIN: CPT | Performed by: INTERNAL MEDICINE

## 2025-05-16 PROCEDURE — 93005 ELECTROCARDIOGRAM TRACING: CPT | Performed by: INTERNAL MEDICINE

## 2025-05-16 PROCEDURE — 80162 ASSAY OF DIGOXIN TOTAL: CPT | Performed by: INTERNAL MEDICINE

## 2025-05-16 PROCEDURE — 85652 RBC SED RATE AUTOMATED: CPT

## 2025-05-16 PROCEDURE — 84484 ASSAY OF TROPONIN QUANT: CPT | Performed by: INTERNAL MEDICINE

## 2025-05-16 PROCEDURE — 25510000001 PERFLUTREN 6.52 MG/ML SUSPENSION 2 ML VIAL: Performed by: INTERNAL MEDICINE

## 2025-05-16 PROCEDURE — 93306 TTE W/DOPPLER COMPLETE: CPT

## 2025-05-16 RX ORDER — OMEGA-3 FATTY ACIDS/FISH OIL 300-1000MG
400 CAPSULE ORAL 2 TIMES DAILY
Qty: 20 EACH | Refills: 0 | Status: SHIPPED | OUTPATIENT
Start: 2025-05-16

## 2025-05-16 RX ORDER — DIGOXIN 125 MCG
125 TABLET ORAL DAILY
Qty: 90 TABLET | Refills: 1 | Status: SHIPPED | OUTPATIENT
Start: 2025-05-16

## 2025-05-16 RX ORDER — CARVEDILOL 3.12 MG/1
3.12 TABLET ORAL 2 TIMES DAILY
Qty: 60 TABLET | Refills: 3 | Status: SHIPPED | OUTPATIENT
Start: 2025-05-16

## 2025-05-16 RX ADMIN — PERFLUTREN 2 ML: 6.52 INJECTION, SUSPENSION INTRAVENOUS at 11:38
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
## 2025-05-21 ENCOUNTER — LAB (OUTPATIENT)
Dept: LAB | Facility: HOSPITAL | Age: 55
End: 2025-05-21
Payer: MEDICAID

## 2025-05-21 DIAGNOSIS — R06.00 DYSPNEA, UNSPECIFIED TYPE: ICD-10-CM

## 2025-05-21 DIAGNOSIS — R07.89 CHEST PAIN, ATYPICAL: ICD-10-CM

## 2025-05-21 DIAGNOSIS — R00.2 PALPITATIONS: ICD-10-CM

## 2025-05-21 LAB
CRP SERPL-MCNC: 5.19 MG/DL (ref 0–0.5)
DIGOXIN SERPL-MCNC: 0.5 NG/ML (ref 0.6–1.2)
ERYTHROCYTE [SEDIMENTATION RATE] IN BLOOD: 52 MM/HR (ref 0–30)

## 2025-05-21 PROCEDURE — 80162 ASSAY OF DIGOXIN TOTAL: CPT

## 2025-05-21 PROCEDURE — 85652 RBC SED RATE AUTOMATED: CPT

## 2025-05-21 PROCEDURE — 36415 COLL VENOUS BLD VENIPUNCTURE: CPT

## 2025-05-21 PROCEDURE — 86140 C-REACTIVE PROTEIN: CPT

## 2025-05-23 DIAGNOSIS — R00.2 PALPITATIONS: Primary | ICD-10-CM

## 2025-05-28 LAB
CV ZIO BASELINE AVG BPM: 88
CV ZIO BASELINE BPM HIGH: 142
CV ZIO BASELINE BPM LOW: 58

## 2025-05-30 ENCOUNTER — OFFICE VISIT (OUTPATIENT)
Age: 55
End: 2025-05-30
Payer: MEDICAID

## 2025-05-30 VITALS
BODY MASS INDEX: 25.96 KG/M2 | SYSTOLIC BLOOD PRESSURE: 128 MMHG | WEIGHT: 137.5 LBS | DIASTOLIC BLOOD PRESSURE: 84 MMHG | HEIGHT: 61 IN | HEART RATE: 70 BPM

## 2025-05-30 DIAGNOSIS — R00.2 PALPITATIONS: ICD-10-CM

## 2025-05-30 DIAGNOSIS — I42.8 NONISCHEMIC CARDIOMYOPATHY: Primary | ICD-10-CM

## 2025-05-30 PROCEDURE — 93000 ELECTROCARDIOGRAM COMPLETE: CPT | Performed by: NURSE PRACTITIONER

## 2025-05-30 PROCEDURE — 99214 OFFICE O/P EST MOD 30 MIN: CPT | Performed by: NURSE PRACTITIONER

## 2025-05-30 PROCEDURE — 1159F MED LIST DOCD IN RCRD: CPT | Performed by: NURSE PRACTITIONER

## 2025-05-30 PROCEDURE — 1160F RVW MEDS BY RX/DR IN RCRD: CPT | Performed by: NURSE PRACTITIONER

## 2025-05-30 RX ORDER — SPIRONOLACTONE 25 MG/1
25 TABLET ORAL DAILY
Qty: 30 TABLET | Refills: 2 | Status: SHIPPED | OUTPATIENT
Start: 2025-05-30

## 2025-05-30 RX ORDER — METHOCARBAMOL 500 MG/1
500 TABLET, FILM COATED ORAL 4 TIMES DAILY
COMMUNITY
Start: 2025-05-12

## 2025-05-30 RX ORDER — CALCIUM CARBONATE 300MG(750)
1 TABLET,CHEWABLE ORAL
COMMUNITY
Start: 2025-05-02
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
## 2025-06-06 ENCOUNTER — HOSPITAL ENCOUNTER (EMERGENCY)
Facility: HOSPITAL | Age: 55
Discharge: HOME | End: 2025-06-06
Attending: STUDENT IN AN ORGANIZED HEALTH CARE EDUCATION/TRAINING PROGRAM
Payer: MEDICAID

## 2025-06-06 ENCOUNTER — APPOINTMENT (OUTPATIENT)
Dept: GENERAL RADIOLOGY | Facility: HOSPITAL | Age: 55
End: 2025-06-06
Payer: MEDICAID

## 2025-06-06 ENCOUNTER — ANCILLARY PROCEDURE (OUTPATIENT)
Dept: EMERGENCY DEPT | Facility: HOSPITAL | Age: 55
End: 2025-06-06
Payer: MEDICAID

## 2025-06-06 VITALS
RESPIRATION RATE: 20 BRPM | TEMPERATURE: 98.5 F | SYSTOLIC BLOOD PRESSURE: 113 MMHG | DIASTOLIC BLOOD PRESSURE: 79 MMHG | HEART RATE: 85 BPM | OXYGEN SATURATION: 100 %

## 2025-06-06 DIAGNOSIS — R09.1 PLEURISY: Primary | ICD-10-CM

## 2025-06-06 DIAGNOSIS — R07.9 NONSPECIFIC CHEST PAIN: ICD-10-CM

## 2025-06-06 LAB
ALBUMIN SERPL-MCNC: 3.9 G/DL (ref 3.5–5.2)
ALBUMIN/GLOB SERPL: 1 G/DL
ALP SERPL-CCNC: 138 U/L (ref 39–117)
ALT SERPL W P-5'-P-CCNC: 10 U/L (ref 1–33)
ANION GAP SERPL CALCULATED.3IONS-SCNC: 10.5 MMOL/L (ref 5–15)
AST SERPL-CCNC: 16 U/L (ref 1–32)
BASOPHILS # BLD AUTO: 0.02 10*3/MM3 (ref 0–0.2)
BASOPHILS NFR BLD AUTO: 0.2 % (ref 0–1.5)
BILIRUB SERPL-MCNC: 0.4 MG/DL (ref 0–1.2)
BUN SERPL-MCNC: 7 MG/DL (ref 6–20)
BUN/CREAT SERPL: 9.9 (ref 7–25)
CALCIUM SPEC-SCNC: 9.4 MG/DL (ref 8.6–10.5)
CHLORIDE SERPL-SCNC: 100 MMOL/L (ref 98–107)
CO2 SERPL-SCNC: 25.5 MMOL/L (ref 22–29)
CREAT SERPL-MCNC: 0.71 MG/DL (ref 0.57–1)
DEPRECATED RDW RBC AUTO: 37.9 FL (ref 37–54)
EGFRCR SERPLBLD CKD-EPI 2021: 102.5 ML/MIN/1.73
EOSINOPHIL # BLD AUTO: 0.01 10*3/MM3 (ref 0–0.4)
EOSINOPHIL NFR BLD AUTO: 0.1 % (ref 0.3–6.2)
ERYTHROCYTE [DISTWIDTH] IN BLOOD BY AUTOMATED COUNT: 13.6 % (ref 12.3–15.4)
GEN 5 1HR TROPONIN T REFLEX: 12 NG/L
GLOBULIN UR ELPH-MCNC: 3.9 GM/DL
GLUCOSE SERPL-MCNC: 140 MG/DL (ref 65–99)
HCT VFR BLD AUTO: 34.9 % (ref 34–46.6)
HGB BLD-MCNC: 10.6 G/DL (ref 12–15.9)
HOLD SPECIMEN: NORMAL
HOLD SPECIMEN: NORMAL
IMM GRANULOCYTES # BLD AUTO: 0.04 10*3/MM3 (ref 0–0.05)
IMM GRANULOCYTES NFR BLD AUTO: 0.4 % (ref 0–0.5)
LYMPHOCYTES # BLD AUTO: 1.81 10*3/MM3 (ref 0.7–3.1)
LYMPHOCYTES NFR BLD AUTO: 18.4 % (ref 19.6–45.3)
MCH RBC QN AUTO: 23.3 PG (ref 26.6–33)
MCHC RBC AUTO-ENTMCNC: 30.4 G/DL (ref 31.5–35.7)
MCV RBC AUTO: 76.9 FL (ref 79–97)
MONOCYTES # BLD AUTO: 0.67 10*3/MM3 (ref 0.1–0.9)
MONOCYTES NFR BLD AUTO: 6.8 % (ref 5–12)
NEUTROPHILS NFR BLD AUTO: 7.28 10*3/MM3 (ref 1.7–7)
NEUTROPHILS NFR BLD AUTO: 74.1 % (ref 42.7–76)
NRBC BLD AUTO-RTO: 0 /100 WBC (ref 0–0.2)
PLATELET # BLD AUTO: 261 10*3/MM3 (ref 140–450)
PMV BLD AUTO: 9.9 FL (ref 6–12)
POTASSIUM SERPL-SCNC: 3.6 MMOL/L (ref 3.5–5.2)
PROT SERPL-MCNC: 7.8 G/DL (ref 6–8.5)
RBC # BLD AUTO: 4.54 10*6/MM3 (ref 3.77–5.28)
SODIUM SERPL-SCNC: 136 MMOL/L (ref 136–145)
TROPONIN T NUMERIC DELTA: -1 NG/L
TROPONIN T SERPL HS-MCNC: 13 NG/L
WBC NRBC COR # BLD AUTO: 9.83 10*3/MM3 (ref 3.4–10.8)
WHOLE BLOOD HOLD COAG: NORMAL
WHOLE BLOOD HOLD SPECIMEN: NORMAL

## 2025-06-06 PROCEDURE — 25010000002 KETOROLAC TROMETHAMINE PER 15 MG: Performed by: STUDENT IN AN ORGANIZED HEALTH CARE EDUCATION/TRAINING PROGRAM

## 2025-06-06 PROCEDURE — 93005 ELECTROCARDIOGRAM TRACING: CPT

## 2025-06-06 PROCEDURE — 36415 COLL VENOUS BLD VENIPUNCTURE: CPT

## 2025-06-06 PROCEDURE — 93005 ELECTROCARDIOGRAM TRACING: CPT | Performed by: STUDENT IN AN ORGANIZED HEALTH CARE EDUCATION/TRAINING PROGRAM

## 2025-06-06 PROCEDURE — 84484 ASSAY OF TROPONIN QUANT: CPT

## 2025-06-06 PROCEDURE — 96372 THER/PROPH/DIAG INJ SC/IM: CPT

## 2025-06-06 PROCEDURE — 84484 ASSAY OF TROPONIN QUANT: CPT | Performed by: STUDENT IN AN ORGANIZED HEALTH CARE EDUCATION/TRAINING PROGRAM

## 2025-06-06 PROCEDURE — 71045 X-RAY EXAM CHEST 1 VIEW: CPT

## 2025-06-06 PROCEDURE — 93308 TTE F-UP OR LMTD: CPT | Performed by: STUDENT IN AN ORGANIZED HEALTH CARE EDUCATION/TRAINING PROGRAM

## 2025-06-06 PROCEDURE — 99284 EMERGENCY DEPT VISIT MOD MDM: CPT

## 2025-06-06 PROCEDURE — 85025 COMPLETE CBC W/AUTO DIFF WBC: CPT

## 2025-06-06 PROCEDURE — 80053 COMPREHEN METABOLIC PANEL: CPT

## 2025-06-06 RX ORDER — SODIUM CHLORIDE 0.9 % (FLUSH) 0.9 %
10 SYRINGE (ML) INJECTION AS NEEDED
Status: DISCONTINUED | OUTPATIENT
Start: 2025-06-06 | End: 2025-06-06 | Stop reason: HOSPADM

## 2025-06-06 RX ORDER — MELOXICAM 15 MG/1
15 TABLET ORAL DAILY
Qty: 14 TABLET | Refills: 0 | Status: SHIPPED | OUTPATIENT
Start: 2025-06-06 | End: 2025-06-06

## 2025-06-06 RX ORDER — KETOROLAC TROMETHAMINE 15 MG/ML
15 INJECTION, SOLUTION INTRAMUSCULAR; INTRAVENOUS ONCE
Status: DISCONTINUED | OUTPATIENT
Start: 2025-06-06 | End: 2025-06-06

## 2025-06-06 RX ORDER — MELOXICAM 15 MG/1
15 TABLET ORAL DAILY
Qty: 14 TABLET | Refills: 0 | Status: SHIPPED | OUTPATIENT
Start: 2025-06-06 | End: 2025-06-13

## 2025-06-06 RX ORDER — ASPIRIN 325 MG
325 TABLET ORAL ONCE
Status: COMPLETED | OUTPATIENT
Start: 2025-06-06 | End: 2025-06-06

## 2025-06-06 RX ORDER — KETOROLAC TROMETHAMINE 15 MG/ML
15 INJECTION, SOLUTION INTRAMUSCULAR; INTRAVENOUS ONCE
Status: COMPLETED | OUTPATIENT
Start: 2025-06-06 | End: 2025-06-06

## 2025-06-06 RX ADMIN — KETOROLAC TROMETHAMINE 15 MG: 15 INJECTION INTRAMUSCULAR at 19:09

## 2025-06-06 RX ADMIN — ASPIRIN 325 MG ORAL TABLET 325 MG: 325 PILL ORAL at 17:36

## 2025-06-06 NOTE — ED PROVIDER NOTES
EMERGENCY DEPARTMENT ENCOUNTER  Room Number:    PCP: Nina Hsieh APRN  Independent Historians: Patient      HPI:  Chief Complaint: had concerns including Shortness of Breath and Chest Pain.     A complete HPI/ROS/PMH/PSH/SH/FH are unobtainable due to: None    Chronic or social conditions impacting patient care (Social Determinants of Health): None      Context: Kaylie Perez is a 54 y.o. female with a medical history of nonischemic cardiomyopathy who presents to the ED c/o acute left-sided chest pain, intermittent, present for the past several days.  She states it is sharp, worse with deep breaths in the left lower chest as well.  No fever or chills.  She reports she recently finished treatment for pneumonia but did not finish all the antibiotics.  No productive cough.      Review of prior external notes (non-ED) -and- Review of prior external test results outside of this encounter: CTA chest from 1 month ago.  Small pericardial effusion, mild left pleural effusion with bibasilar atelectasis.  Pneumonia with acquired clinical exclusion.  Patient treated clinically for pneumonia at that time      Prescription drug monitoring program review:         PAST MEDICAL HISTORY  Active Ambulatory Problems     Diagnosis Date Noted    Dyspnea 2024    Palpitations 2024    Chest pain, atypical 2024    Abdominal pain 2024    Autonomic dysfunction 2024    Long COVID 2024    Nonischemic cardiomyopathy 2025     Resolved Ambulatory Problems     Diagnosis Date Noted    No Resolved Ambulatory Problems     Past Medical History:   Diagnosis Date    Asthma     Chronic fatigue     Chronic pain     COVID-19 2023    Diverticulosis     GERD (gastroesophageal reflux disease)     BECKIE (obstructive sleep apnea)          PAST SURGICAL HISTORY  Past Surgical History:   Procedure Laterality Date    CARPAL TUNNEL RELEASE Bilateral      SECTION      COLONOSCOPY       COLONOSCOPY N/A 11/29/2024    Procedure: COLONOSCOPY to cecum with cold biopsy polypectomy;  Surgeon: Julian Rene MD;  Location: [facility];  Service: Gastroenterology;  Laterality: N/A;  pre: abdominal pain, wt loss, bloating  post: diverticulosis, hemorrhoids, polyp    ENDOMETRIAL ABLATION Right     ENDOSCOPY N/A 11/29/2024    Procedure: ESOPHAGOGASTRODUODENOSCOPY with cold focep biopsies;  Surgeon: Julian Rene MD;  Location: [facility];  Service: Gastroenterology;  Laterality: N/A;  pre: abdominal pain, wt loss, bloating  post: duodenal lipoma    UPPER GASTROINTESTINAL ENDOSCOPY           FAMILY HISTORY  Family History   Problem Relation Age of Onset    Colon polyps Cousin     Colon cancer Neg Hx     Crohn's disease Neg Hx     Irritable bowel syndrome Neg Hx     Ulcerative colitis Neg Hx          SOCIAL HISTORY  Social History     Socioeconomic History    Marital status:    Tobacco Use    Smoking status: Never     Passive exposure: Never    Smokeless tobacco: Never   Vaping Use    Vaping status: Never Used   Substance and Sexual Activity    Alcohol use: Yes     Comment: Socially    Drug use: Yes     Types: Marijuana    Sexual activity: Defer       ALLERGIES  Clomipramine, Codeine, Doxycycline, Flagyl [metronidazole], Tramadol, Erythromycin, Hydrocodone, Sulfamethoxazole-trimethoprim, and Wound dressing adhesive      PHYSICAL EXAM    I have reviewed the triage vital signs and nursing notes.    ED Triage Vitals   Temp Heart Rate Resp BP SpO2   03/02/25 1448 03/02/25 1448 03/02/25 1448 03/02/25 1450 03/02/25 1448   97.4 °F (36.3 °C) 95 16 141/84 97 %      Temp src Heart Rate Source Patient Position BP Location FiO2 (%)   -- -- -- -- --              Physical Exam  GENERAL: alert, no acute distress  SKIN: Warm, dry  HENT: Normocephalic, atraumatic  EYES: no scleral icterus  CV: regular rhythm, regular rate  RESPIRATORY: normal effort, lungs clear  ABDOMEN: soft, nondistended,  nontender  MUSCULOSKELETAL: no deformity  NEURO: alert, moves all extremities, follows commands        LAB RESULTS  Recent Results (from the past 24 hours)   Comprehensive Metabolic Panel    Collection Time: 06/06/25  3:47 PM    Specimen: Arm, Right; Blood   Result Value Ref Range    Glucose 140 (H) 65 - 99 mg/dL    BUN 7.0 6.0 - 20.0 mg/dL    Creatinine 0.71 0.57 - 1.00 mg/dL    Sodium 136 136 - 145 mmol/L    Potassium 3.6 3.5 - 5.2 mmol/L    Chloride 100 98 - 107 mmol/L    CO2 25.5 22.0 - 29.0 mmol/L    Calcium 9.4 8.6 - 10.5 mg/dL    Total Protein 7.8 6.0 - 8.5 g/dL    Albumin 3.9 3.5 - 5.2 g/dL    ALT (SGPT) 10 1 - 33 U/L    AST (SGOT) 16 1 - 32 U/L    Alkaline Phosphatase 138 (H) 39 - 117 U/L    Total Bilirubin 0.4 0.0 - 1.2 mg/dL    Globulin 3.9 gm/dL    A/G Ratio 1.0 g/dL    BUN/Creatinine Ratio 9.9 7.0 - 25.0    Anion Gap 10.5 5.0 - 15.0 mmol/L    eGFR 102.5 >60.0 mL/min/1.73   High Sensitivity Troponin T    Collection Time: 06/06/25  3:47 PM    Specimen: Arm, Right; Blood   Result Value Ref Range    HS Troponin T 13 <14 ng/L   Green Top (Gel)    Collection Time: 06/06/25  3:47 PM   Result Value Ref Range    Extra Tube Hold for add-ons.    Lavender Top    Collection Time: 06/06/25  3:47 PM   Result Value Ref Range    Extra Tube hold for add-on    Gold Top - SST    Collection Time: 06/06/25  3:47 PM   Result Value Ref Range    Extra Tube Hold for add-ons.    Light Blue Top    Collection Time: 06/06/25  3:47 PM   Result Value Ref Range    Extra Tube Hold for add-ons.    CBC Auto Differential    Collection Time: 06/06/25  3:47 PM    Specimen: Arm, Right; Blood   Result Value Ref Range    WBC 9.83 3.40 - 10.80 10*3/mm3    RBC 4.54 3.77 - 5.28 10*6/mm3    Hemoglobin 10.6 (L) 12.0 - 15.9 g/dL    Hematocrit 34.9 34.0 - 46.6 %    MCV 76.9 (L) 79.0 - 97.0 fL    MCH 23.3 (L) 26.6 - 33.0 pg    MCHC 30.4 (L) 31.5 - 35.7 g/dL    RDW 13.6 12.3 - 15.4 %    RDW-SD 37.9 37.0 - 54.0 fl    MPV 9.9 6.0 - 12.0 fL    Platelets  261 140 - 450 10*3/mm3    Neutrophil % 74.1 42.7 - 76.0 %    Lymphocyte % 18.4 (L) 19.6 - 45.3 %    Monocyte % 6.8 5.0 - 12.0 %    Eosinophil % 0.1 (L) 0.3 - 6.2 %    Basophil % 0.2 0.0 - 1.5 %    Immature Grans % 0.4 0.0 - 0.5 %    Neutrophils, Absolute 7.28 (H) 1.70 - 7.00 10*3/mm3    Lymphocytes, Absolute 1.81 0.70 - 3.10 10*3/mm3    Monocytes, Absolute 0.67 0.10 - 0.90 10*3/mm3    Eosinophils, Absolute 0.01 0.00 - 0.40 10*3/mm3    Basophils, Absolute 0.02 0.00 - 0.20 10*3/mm3    Immature Grans, Absolute 0.04 0.00 - 0.05 10*3/mm3    nRBC 0.0 0.0 - 0.2 /100 WBC   ECG 12 Lead ED Triage Standing Order; Chest Pain    Collection Time: 06/06/25  3:47 PM   Result Value Ref Range    QT Interval 362 ms    QTC Interval 425 ms   High Sensitivity Troponin T 1Hr    Collection Time: 06/06/25  5:43 PM    Specimen: Arm, Left; Blood   Result Value Ref Range    HS Troponin T 12 <14 ng/L    Troponin T Numeric Delta -1 Abnormal if >/=3 ng/L       RADIOLOGY  XR Chest 1 View  Result Date: 6/6/2025  XR CHEST 1 VW-  HISTORY: Female who is 54 years-old, chest pain  TECHNIQUE: Supine views of the chest  COMPARISON: 11/26/2024  FINDINGS: The heart is enlarged. Heart, mediastinum and pulmonary vasculature are unremarkable. Linear likely atelectasis or scarring in the lower lungs. Small left basilar atelectasis or infiltrate, follow-up suggested. No large pleural effusion, or pneumothorax. No acute osseous process.      As described.  This report was finalized on 6/6/2025 4:23 PM by Dr. Vitor Gutierrez M.D on Workstation: [id number]          MEDICATIONS GIVEN IN ER  Medications   sodium chloride 0.9 % flush 10 mL (has no administration in time range)   aspirin tablet 325 mg (325 mg Oral Given 6/6/25 3276)   ketorolac (TORADOL) injection 15 mg (15 mg Intramuscular Given 6/6/25 1909)         ORDERS PLACED DURING THIS VISIT:  Orders Placed This Encounter   Procedures    XR Chest 1 View    US TRANSTHORACIC ECHO IN ED BY PROVIDER    Júnior Siddiqi     Comprehensive Metabolic Panel    High Sensitivity Troponin T    CBC Auto Differential    High Sensitivity Troponin T 1Hr    NPO Diet NPO Type: Strict NPO    Undress & Gown    Continuous Pulse Oximetry    Oxygen Therapy- Nasal Cannula; Titrate 1-6 LPM Per SpO2; 90 - 95%    ECG 12 Lead ED Triage Standing Order; Chest Pain    ECG 12 Lead ED Triage Standing Order; Chest Pain    Insert Peripheral IV    CBC & Differential    Green Top (Gel)    Lavender Top    Gold Top - SST    Light Blue Top         OUTPATIENT MEDICATION MANAGEMENT:  Current Facility-Administered Medications Ordered in Epic   Medication Dose Route Frequency Provider Last Rate Last Admin    sodium chloride 0.9 % flush 10 mL  10 mL Intravenous PRN Alhaji Muir MD         Current Outpatient Medications Ordered in Epic   Medication Sig Dispense Refill    meloxicam (MOBIC) 15 MG tablet Take 1 tablet by mouth Daily for 14 days. 14 tablet 0    albuterol sulfate  (90 Base) MCG/ACT inhaler INHALE 1 TO 2 PUFFS BY MOUTH EVERY 4 TO 6 HOURS AS NEEDED FOR SHORTNESS OF BREATH OR WHEEZING      atorvastatin (LIPITOR) 40 MG tablet Take 1 tablet by mouth Daily.      carvedilol (COREG) 3.125 MG tablet Take 1 tablet by mouth 2 (Two) Times a Day. 60 tablet 3    cholecalciferol (VITAMIN D3) 1.25 MG (66136 UT) capsule Take 1 capsule by mouth Every 7 (Seven) Days.      doxepin (SINEquan) 10 MG capsule Take 1 capsule by mouth 2 (Two) Times a Day.      FLUoxetine (PROzac) 40 MG capsule Take 1 capsule by mouth Daily.      Melatonin 10 MG tablet dispersible 1 tablet every night at bedtime.      methocarbamol (ROBAXIN) 500 MG tablet Take 1 tablet by mouth 4 (Four) Times a Day.      metoclopramide (REGLAN) 10 MG tablet Take 1 tablet by mouth Every 6 (Six) Hours As Needed.      ondansetron ODT (ZOFRAN-ODT) 4 MG disintegrating tablet Take 1-2 tablets by mouth Every 8 (Eight) Hours As Needed for Nausea or Vomiting. 15 tablet 0    pantoprazole (PROTONIX) 40 MG EC tablet Take  1 tablet by mouth Daily. 30 tablet 0    Polyethylene Glycol 3350 powder Use 17 g Daily.      spironolactone (ALDACTONE) 25 MG tablet Take 1 tablet by mouth Daily. 30 tablet 2         PROCEDURES  US TRANSTHORACIC ECHO IN ED BY PROVIDER    Date/Time: 6/6/2025 6:24 PM    Performed by: Alhaji Muir MD  Authorized by: Alhaji Muir MD    Procedure details:     Indications: chest pain and dyspnea    Impression:     Impression comment:  Decreased LVEF, visually estimated 30 to 50%  Comments:      Parasternal long and short axis views obtained.  EPSS grossly elevated.  No significant pericardial effusion noted              PROGRESS, DATA ANALYSIS, CONSULTS, AND MEDICAL DECISION MAKING  All labs have been independently interpreted by me.  All radiology studies have been reviewed by me. All EKG's have been independently viewed and interpreted by me.  Discussion below represents my analysis of pertinent findings related to patient's condition, differential diagnosis, treatment plan and final disposition.    Differential diagnosis includes but is not limited to ACS, STEMI, NSTEMI, dysrhythmia, CHF exacerbation, pericardial effusion, pneumonia, pleural effusion.    Clinical Scores:                                       ED Course as of 06/06/25 1911 Fri Jun 06, 2025 1738 XR Chest 1 View  I reviewed patient's xray image(s), possible small atelectasis or infiltrate left lower lung, interpreted by self  I reviewed the radiologist's interpretation of above image(s)   [DN]   1738 ECG 12 Lead ED Triage Standing Order; Chest Pain  Sinus rhythm, rate 83, right axis deviation, low voltage throughout inferior and lateral leads, no significant ST changes, no STEMI    I compared EKG to prior 11/27/2024.  Axis was normal at time.  Low voltage today is new, EKGs interpreted by self [DN]   1739 WBC: 9.83 [DN]   1739 Creatinine: 0.71 [DN]   1739 HS Troponin T: 13 [DN]   1825 Troponin T Numeric Delta: -1 [DN]   1832 Bedside  ultrasound with decreased LVEF, 30 to 50%.  No large pericardial effusion.  Interpreted by self [DN]   1832 I discussed results with patient, she reports she recently finished treatment for pneumonia.  Suspect chest x-ray findings are related to this and patient suffering from pleurisy, she is agreeable with plan for discharge home at this time [DN]      ED Course User Index  [DN] Alhaji Muir MD             AS OF 19:11 EDT VITALS:    BP - 113/79  HR - 85  TEMP - 98.5 °F (36.9 °C) (Oral)  O2 SATS - 100%    COMPLEXITY OF CARE  Admission was considered but after careful review of the patient's presentation, physical examination, diagnostic results, and response to treatment the patient may be safely discharged with outpatient follow-up.      DIAGNOSIS  Final diagnoses:   Pleurisy   Nonspecific chest pain         DISPOSITION  ED Disposition       ED Disposition   Discharge    Condition   Stable    Comment   --               New Medications Ordered This Visit   Medications    sodium chloride 0.9 % flush 10 mL    aspirin tablet 325 mg    meloxicam (MOBIC) 15 MG tablet     Sig: Take 1 tablet by mouth Daily for 14 days.     Dispense:  14 tablet     Refill:  0    ketorolac (TORADOL) injection 15 mg       Please note that portions of this document were completed with a voice recognition program.    Note Disclaimer: At [facility], we believe that sharing information builds trust and better relationships. You are receiving this note because you recently visited [facility]. It is possible you will see health information before a provider has talked with you about it. This kind of information can be easy to misunderstand. To help you fully understand what it means for your health, we urge you to discuss this note with your provider.         Alhaji Muir MD  06/06/25 4146

## 2025-06-08 LAB
QT INTERVAL: 362 MS
QTC INTERVAL: 425 MS

## 2025-06-13 ENCOUNTER — OFFICE VISIT (OUTPATIENT)
Age: 55
End: 2025-06-13
Payer: MEDICAID

## 2025-06-13 ENCOUNTER — LAB (OUTPATIENT)
Dept: LAB | Facility: HOSPITAL | Age: 55
End: 2025-06-13
Payer: MEDICAID

## 2025-06-13 VITALS
SYSTOLIC BLOOD PRESSURE: 110 MMHG | WEIGHT: 161 LBS | HEART RATE: 85 BPM | HEIGHT: 61 IN | BODY MASS INDEX: 30.4 KG/M2 | DIASTOLIC BLOOD PRESSURE: 72 MMHG

## 2025-06-13 DIAGNOSIS — R07.89 CHEST PAIN, ATYPICAL: ICD-10-CM

## 2025-06-13 DIAGNOSIS — R06.09 DYSPNEA ON EXERTION: ICD-10-CM

## 2025-06-13 DIAGNOSIS — I42.8 NONISCHEMIC CARDIOMYOPATHY: Primary | ICD-10-CM

## 2025-06-13 DIAGNOSIS — R05.9 COUGH, UNSPECIFIED TYPE: ICD-10-CM

## 2025-06-13 PROCEDURE — 83880 ASSAY OF NATRIURETIC PEPTIDE: CPT | Performed by: NURSE PRACTITIONER

## 2025-06-13 PROCEDURE — 93000 ELECTROCARDIOGRAM COMPLETE: CPT | Performed by: NURSE PRACTITIONER

## 2025-06-13 PROCEDURE — 99214 OFFICE O/P EST MOD 30 MIN: CPT | Performed by: NURSE PRACTITIONER

## 2025-06-13 PROCEDURE — 1159F MED LIST DOCD IN RCRD: CPT | Performed by: NURSE PRACTITIONER

## 2025-06-13 PROCEDURE — 80048 BASIC METABOLIC PNL TOTAL CA: CPT | Performed by: NURSE PRACTITIONER

## 2025-06-13 PROCEDURE — 1160F RVW MEDS BY RX/DR IN RCRD: CPT | Performed by: NURSE PRACTITIONER
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
## 2025-06-24 ENCOUNTER — TELEPHONE (OUTPATIENT)
Age: 55
End: 2025-06-24
Payer: MEDICAID

## 2025-06-24 NOTE — TELEPHONE ENCOUNTER
Please let her know I discussed the plan of care with Dr. Verdugo.  He did not recommend any changes.  She should follow-up with him in 3 months.

## 2025-06-24 NOTE — TELEPHONE ENCOUNTER
I called and gave patient this message.  She wanted to let you know that she is feeling a lot better and the pain in her chest is gone.  I scheduled her to see WD in 3 months.    Edna Pickard Cardiology Triage  06/24/25 09:33 EDT

## 2025-07-08 RX ORDER — SPIRONOLACTONE 25 MG/1
25 TABLET ORAL DAILY
Qty: 90 TABLET | Refills: 2 | Status: SHIPPED | OUTPATIENT
Start: 2025-07-08

## (undated) DEVICE — BLCK/BITE BLOX W/DENTL/RIM W/STRAP 54F

## (undated) DEVICE — KT ORCA ORCAPOD DISP STRL

## (undated) DEVICE — SENSR O2 OXIMAX FNGR A/ 18IN NONSTR

## (undated) DEVICE — ADAPT CLN BIOGUARD AIR/H2O DISP

## (undated) DEVICE — TUBING, SUCTION, 1/4" X 10', STRAIGHT: Brand: MEDLINE

## (undated) DEVICE — LN SMPL CO2 SHTRM SD STREAM W/M LUER

## (undated) DEVICE — CANN O2 ETCO2 FITS ALL CONN CO2 SMPL A/ 7IN DISP LF

## (undated) DEVICE — FRCP BX RADJAW4 NDL 2.8 240CM LG OG BX40